# Patient Record
Sex: FEMALE | Race: WHITE | NOT HISPANIC OR LATINO | Employment: OTHER | URBAN - METROPOLITAN AREA
[De-identification: names, ages, dates, MRNs, and addresses within clinical notes are randomized per-mention and may not be internally consistent; named-entity substitution may affect disease eponyms.]

---

## 2017-03-29 ENCOUNTER — CONVERSION ENCOUNTER (OUTPATIENT)
Dept: MAMMOGRAPHY | Facility: HOSPITAL | Age: 57
End: 2017-03-29

## 2018-04-24 ENCOUNTER — CONVERSION ENCOUNTER (OUTPATIENT)
Dept: MAMMOGRAPHY | Facility: HOSPITAL | Age: 58
End: 2018-04-24

## 2018-08-15 ENCOUNTER — OFFICE VISIT CONVERTED (OUTPATIENT)
Dept: ONCOLOGY | Facility: HOSPITAL | Age: 58
End: 2018-08-15
Attending: INTERNAL MEDICINE

## 2018-09-12 ENCOUNTER — OFFICE VISIT CONVERTED (OUTPATIENT)
Dept: ONCOLOGY | Facility: HOSPITAL | Age: 58
End: 2018-09-12
Attending: INTERNAL MEDICINE

## 2018-09-27 ENCOUNTER — OFFICE VISIT CONVERTED (OUTPATIENT)
Dept: ONCOLOGY | Facility: HOSPITAL | Age: 58
End: 2018-09-27
Attending: INTERNAL MEDICINE

## 2019-01-02 ENCOUNTER — HOSPITAL ENCOUNTER (OUTPATIENT)
Dept: OTHER | Facility: HOSPITAL | Age: 59
Discharge: HOME | End: 2019-01-02
Attending: INTERNAL MEDICINE

## 2019-01-02 LAB
25(OH)D3 SERPL-MCNC: 39.8 NG/ML (ref 30–100)
ERYTHROCYTE [SEDIMENTATION RATE] IN BLOOD: 12 MM/H (ref 0–30)
T4 FREE SERPL-MCNC: 1.6 NG/DL (ref 0.9–1.8)
TSH SERPL-ACNC: 4.4 M[IU]/L (ref 0.27–4.2)

## 2019-01-03 LAB
C3 SERPL-MCNC: 78 MG/DL (ref 82–167)
C4 SERPL-MCNC: <2 MG/DL (ref 14–44)
CH50 SERPL-ACNC: <14 U/ML
CONV HEPATITIS COMMENT: NORMAL
DSDNA AB SER-ACNC: <1 IU/ML (ref 0–9)
HAV AB SER QL IA: NEGATIVE
HAV IGM SERPL QL IA: NEGATIVE
HBV CORE AB SER DONR QL IA: NEGATIVE
HBV CORE IGM SERPL QL IA: NEGATIVE
HBV SURFACE AB SER QL: NON REACTIVE
HBV SURFACE AG SERPL QL IA: NEGATIVE
HCV AB S/CO SERPL IA: <0.1 S/CO RATIO (ref 0–0.9)

## 2019-01-08 LAB — Lab: <1.2 UG EQ/ML

## 2019-04-10 ENCOUNTER — HOSPITAL ENCOUNTER (OUTPATIENT)
Dept: OTHER | Facility: HOSPITAL | Age: 59
Discharge: HOME | End: 2019-04-10
Attending: INTERNAL MEDICINE

## 2019-04-10 LAB
25(OH)D3 SERPL-MCNC: 44.5 NG/ML (ref 30–100)
ERYTHROCYTE [SEDIMENTATION RATE] IN BLOOD: 5 MM/H (ref 0–30)
T4 FREE SERPL-MCNC: 1.8 NG/DL (ref 0.9–1.8)
TSH SERPL-ACNC: 1.61 M[IU]/L (ref 0.27–4.2)

## 2019-07-15 ENCOUNTER — HOSPITAL ENCOUNTER (OUTPATIENT)
Dept: OTHER | Facility: HOSPITAL | Age: 59
Discharge: HOME | End: 2019-07-15
Attending: INTERNAL MEDICINE

## 2019-07-15 LAB
25(OH)D3 SERPL-MCNC: 43.4 NG/ML (ref 30–100)
CALCIUM SERPL-MCNC: 9.2 MG/DL (ref 8.7–10.4)
ERYTHROCYTE [SEDIMENTATION RATE] IN BLOOD: 6 MM/H (ref 0–30)

## 2019-11-07 ENCOUNTER — HOSPITAL ENCOUNTER (OUTPATIENT)
Dept: OTHER | Facility: HOSPITAL | Age: 59
Discharge: HOME | End: 2019-11-07
Attending: INTERNAL MEDICINE

## 2019-11-07 LAB
25(OH)D3 SERPL-MCNC: 42 NG/ML (ref 30–100)
CALCIUM SERPL-MCNC: 9.4 MG/DL (ref 8.7–10.4)
ERYTHROCYTE [SEDIMENTATION RATE] IN BLOOD: 4 MM/H (ref 0–30)

## 2020-03-11 ENCOUNTER — HOSPITAL ENCOUNTER (OUTPATIENT)
Dept: OTHER | Facility: HOSPITAL | Age: 60
Discharge: HOME | End: 2020-03-11
Attending: INTERNAL MEDICINE

## 2020-03-11 LAB
25(OH)D3 SERPL-MCNC: 35.1 NG/ML (ref 30–100)
CALCIUM SERPL-MCNC: 9 MG/DL (ref 8.7–10.4)
ERYTHROCYTE [SEDIMENTATION RATE] IN BLOOD: 2 MM/H (ref 0–30)

## 2020-08-07 ENCOUNTER — HOSPITAL ENCOUNTER (OUTPATIENT)
Dept: OTHER | Facility: HOSPITAL | Age: 60
Discharge: HOME | End: 2020-08-07
Attending: NURSE PRACTITIONER

## 2020-11-24 ENCOUNTER — HOSPITAL ENCOUNTER (OUTPATIENT)
Dept: OTHER | Facility: HOSPITAL | Age: 60
Discharge: HOME | End: 2020-11-24
Attending: INTERNAL MEDICINE

## 2020-11-24 LAB
25(OH)D3 SERPL-MCNC: 29.8 NG/ML (ref 30–100)
CALCIUM SERPL-MCNC: 8.8 MG/DL (ref 8.7–10.4)
CREAT UR-MCNC: 0.9 MG/DL (ref 0.5–0.9)

## 2021-01-29 ENCOUNTER — HOSPITAL ENCOUNTER (OUTPATIENT)
Dept: OTHER | Facility: HOSPITAL | Age: 61
Discharge: HOME | End: 2021-01-29
Attending: NURSE PRACTITIONER

## 2021-02-02 ENCOUNTER — HOSPITAL ENCOUNTER (OUTPATIENT)
Dept: ONCOLOGY | Facility: HOSPITAL | Age: 61
Discharge: HOME | End: 2021-02-02
Attending: INTERNAL MEDICINE

## 2021-02-02 ENCOUNTER — OFFICE VISIT CONVERTED (OUTPATIENT)
Dept: ONCOLOGY | Facility: HOSPITAL | Age: 61
End: 2021-02-02
Attending: INTERNAL MEDICINE

## 2021-02-03 LAB
CONV IMMUNOGLOBULIN G (IGG): 242 MG/DL (ref 586–1602)
CONV IMMUNOGLOBULIN M (IGM): 61 MG/DL (ref 26–217)
IGA SERPL-MCNC: 8 MG/DL (ref 87–352)
PROT PATTERN SERPL IFE-IMP: ABNORMAL

## 2021-05-28 VITALS
BODY MASS INDEX: 47.09 KG/M2 | WEIGHT: 293 LBS | SYSTOLIC BLOOD PRESSURE: 134 MMHG | OXYGEN SATURATION: 100 % | RESPIRATION RATE: 18 BRPM | HEIGHT: 66 IN | DIASTOLIC BLOOD PRESSURE: 74 MMHG | TEMPERATURE: 98.5 F | HEART RATE: 109 BPM

## 2021-05-28 VITALS
HEIGHT: 66 IN | HEART RATE: 70 BPM | SYSTOLIC BLOOD PRESSURE: 126 MMHG | DIASTOLIC BLOOD PRESSURE: 67 MMHG | TEMPERATURE: 98.6 F | OXYGEN SATURATION: 100 % | WEIGHT: 133.82 LBS | OXYGEN SATURATION: 99 % | HEART RATE: 66 BPM | DIASTOLIC BLOOD PRESSURE: 68 MMHG | BODY MASS INDEX: 21.15 KG/M2 | WEIGHT: 135.14 LBS | OXYGEN SATURATION: 99 % | TEMPERATURE: 99.2 F | SYSTOLIC BLOOD PRESSURE: 130 MMHG | WEIGHT: 131.61 LBS | DIASTOLIC BLOOD PRESSURE: 80 MMHG | HEIGHT: 66 IN | SYSTOLIC BLOOD PRESSURE: 130 MMHG | TEMPERATURE: 98.3 F | HEIGHT: 66 IN | BODY MASS INDEX: 21.51 KG/M2 | HEART RATE: 62 BPM | BODY MASS INDEX: 21.72 KG/M2

## 2021-05-28 NOTE — PROGRESS NOTES
Patient: JEFF GALE     Acct: [id number]     Report: #[id number]  UNIT #: [id number]     : 1960    Encounter Date:2018  PRIMARY CARE: LEIGH ANN SEGAL  ***Signed***  --------------------------------------------------------------------------------------------------------------------  Visit Type      Established Patient Visit            Chief Complaint      Fatigue.  lymphoma            Referring Provider/Copies To      Referring Provider:  Ramakrishna Valencia            Allergies      Coded Allergies:             LEVOFLOXACIN (Verified  Allergy, Severe, ANAPHALAXIS, 8/15/18)            Medications      Last Reconciled on 18 08:50 by SALVADOR MARTELL MD      Vacyclovir HCl (Valtrex) 1,000 Mg Tablet      1000 MG PO QDAY, TAB         Reported         18       rOPINIRole HCl (Requip) 0.25 Mg Tab      0.25 MG PO HS for 30 Days, #30 TAB         Reported         18       traZODone HCl (traZODone HCl*) 100 Mg Tablet      100 MG PO HS, #30 TAB 0 Refills         Reported         18       Alendronate Sodium (Alendronate) Unknown Strength Tablet      PO Fr, #4 TAB         Reported         17       Cyanocobalamin (Cyanocobalamin Injection) Unknown Strength Vial      IM ONCE, #1 VIAL         Reported         17       Glucosamine Hcl/Chondro Garcia A (GLUCOSAMINE-CHONDROITIN 500 mg/400 mg/10 ml)     Unknown Strength Liquid      PO QDAY, ML         Reported         17       Aspirin (Aspirin Baby *) Unknown Strength Tab.chew      PO QDAY, #30 TAB.CHEW 0 Refills         Reported         17       Naproxen Sodium (Aleve) 220 Mg Tab      1 TAB PO BID, TAB         Reported         9/21/10       (Provair)   No Conflict Check      1 - 2 PUFFS INH PRN         Reported         9/21/10       Levothyroxine Sodium (Levothroid*) 0.1 Mg Tablet      1 TAB PO QAM         Reported         9/21/10            History and Present Illness      Past Oncology Illness History      Mrs. Gale  is a pleasant but complicated lady with remote history of lymphoma     which was diagnosed and treated in 2010 and she continues to be in complete     remission. More recently she had developed significant problems with anemia,     neutropenia as well as intermittent thrombocytopenia. Patient is known to have     joint problems as well as splenomegaly. She has been evaluated by bone marrow     transplant program at the [facility] by Dr. cadena and Dr. Wagner. It     was felt that patient has overlap autoimmune disorder even though she does not     have characteristic findings of rheumatoid arthritis or lupus. 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 She also gets     B12 injections for pernicious anemia. She is known to have hypothyroidism     because of which she is maintained on thyroid hormone replacement therapy.     Patient is an ex-smoker and because of significant smoking history she underwent     a screening CT scan of the chest in April 2018 which was negative but patient     was found to have emphysema. At the moment I do not have complete records of the     patient from [facility] which we are trying to obtain. Her only     complaint is lower back pain 5 out of 10 and fatigue.            Patient has complex autoimmune problems causing pancytopenia and splenomegaly.     She has been treated with Remicade with significant improvement of her anemia,     in fact she has become transfusion independent. We will repeat the blood work     today including autoimmune serology. We will try to obtain her old records     spastically most recent CT scan of the abdomen to document splenomegaly and to     make sure there is no significant lymphadenopathy for recurrent lymphoma. We     will try to obtain her records regarding her lymphoma diagnosis as well. In the      meantime we will start her on B12 replacement injection therapy.            -September .  WBC 5.25.  Hemoglobin 12.1.  Platelet count 123,000.  Iron     49.  TIBC 355.  Ferritin 80.            HPI - Oncology Interim      Patient described fatigue has improved. Feels like weakness all over the body.     No alleviating factors. Fatigue is worsened with activities of daily living. No     associated symptoms of pain.            PAST, FAMILY   Past Medical History      Past Medical History:  No Diabetes Type 1, No Diabetes Type 2; Thyroid Disease;     No COPD, No Emphysema, No Hypertension, No Stroke, No High Cholesterol, No Heart     Attack, No Bleeding Condition, No Low or High RBC Count, No Low or High WBC     Count, No Low or High Platelet Coun, No Hepatitis, No Kidney Disease, No     Depression, No Alzheimer's Disease, No Mental Disease, No Seizures; Arthritis,     Osteoporosis; No Osteopenia, No Short of Air, No Sleep apnea, No Liver Disease,     No STD, No Enlarged Prostate, No Other      Hematology/oncology:  REPORTS HX OF: Anemia, Lymphoma; DENIES HX OF: Previous     Treatment for CA, Bladder Cancer, Blood cancer, Brain cancer, Breast cancer,     Cervical cancer, Coagulopathy, Colorectal cancer, Endocrine cancer, Eye cancer,     GI cancer,  cancer, Kidney cancer, Leukemia, Leukocytosis, Leukopenia, Liver     cancer, Lung cancer, Musculoskeletal cancer, Myeloma, Neurologic cancer, Oral     cancer, Ovarian cancer, Skin cancer, Stomach cancer, Thrombocytopenia, Thyroid     cancer, Uterine cancer, Other cancer history, Other hematologic history      Genetic/metabolic:  DENIES HX OF: Cystic fibrosis, Down syndrome, Other genetic     history, Other metabolic history            Past Surgical History      REPORTS HX OF: Biopsy (3 BONE MARROW BIOPSIES), Other Past Surgical Hx (OPEN     HEART SURGERY, TUBAL LIAGATION, SKIN GRAFT ON LEG, TONSILS REMOVED AND WISDOM     TEETH); DENIES HX OF: Cataract extraction,  Thyroid surgery, Lung biopsy, CABG     surgery, Coronary stent, Valve replacement, Appendectomy, Cholecystectomy,     Splenectomy, Bladder surgery, Nephrectomy, Joint replacement, Frature repair,     Skin cancer removal, Melanoma excision, Spinal surgery, Breast biopsy,     Lumpectomy, Mastectomy, bilateral, Mastectomy, right, Mastectomy, left,     Hysterectomy, Peg Tube Placement, VAD Placement            Family History      REPORTS HX OF: Anemia (SISTER); DENIES HX OF: Blood disorders, Blood Cancer,     Breast cancer, Cervical cancer, Coagulopathy, Colorectal cancer, Endocrine     Cancer, Eye Cancer, GI Cancer,  Cancer, Kidney Cancer, Leukemia, Leukocytosis,     Leukopenia, Liver Cancer, Lung cancer, Lymphoma, Melanoma, Musculoskeletal     Cancer, Myeloma, Neurologic Cancer, Oral Cancer, Ovarian cancer, Prostate     cancer, Skin Cancer, Stomach Cancer, Testicular Cancer, Thrombocytopenia,     Thyroid cancer, Uterine cancer, Other Cancer History, Other Hematology History            Social History      Lives independently:  No            Tobacco Use      Tobacco status:  Former smoker      Smoking packs/day:  1      Quit status:  Quit date established (11/2013)            Alcohol Use      Alcohol intake:  None            Substance Use      Substance use:  Denies use            REVIEW OF SYSTEMS      General:  Complains of: Fatigue; Denies: Appetite change, Excessive sweating,     Fever, Night sweats, Weight gain, Weight loss, Other      Eyes:  Denies: Blurred vision, Corrective lenses, Diplopia, Eye irritation, Eye     pain, Eye redness, Spots in vision, Vision loss, Other      Ears, nose, mouth, throat:  Denies: Headache, Seizures, Visual Changes, Hearing     loss, Sinus Congestion, Hoarseness, Sore throat, Other      Cardiovascular:  Denies: Chest pain, Irregular heartbeat, Palpitations, Swollen     ankles/legs, Other      Respiratory:  Denies: Chest pain, Shortness of Air, Productive cough, Coughing     blood,  Other      Gastrointestinal:  Denies: Nausea, Vomiting, Problem swallowing, Frequent hear    tburn, Constipation, Diarrhea, Tarry stools, Bloody stools, Unable to control     bowels, Other      Kidney/Bladder:  Denies: Painful Urination, Change in urinary stream, Blood in     urine, Incontinence, Frequent Urination, Decreased urine stream, Other      Musculoskeletal:  Denies: New Back pain, Leg Cramps, Painful Joints, Swollen     Joints, Muscle Pain, Muscle weakness, Other      Skin:  DENIES: Jaundice, Easy Bleeding, Lesions/changes in moles, Nail changes,     Skin Discoloration, Rash, Other      Neurological:  Denies: Dizziness, Fainting, Numbness\Tingling, Paralysis,     Seizures, Other      Psychiatric:  Complains of: AAO X 3; Denies: Anxiety, Panic attacks, Depression,     Memory loss, Other      Endocrine:  DiabetesThyroid DisorderOsteoporosisEndocrine Other      Hematologic/lymphatic:  Denies: Bruising, Bleeding, Enlarged Lymph Nodes,     Recurrent infections, Other      Reproductive:  Denies Pregnant, Denies Menopause, Denies Still Menstruating,     Denies Heavy Periods, Denies Other            VITAL SIGNS,PAIN/FATIGUE SCORE      Vitals      Height 5 ft 5.75 in / 167 cm      Weight 131 lbs 9.834 oz / 59.7 kg      BSA 1.66 m2      BMI 21.4 kg/m2      Temperature 99.2 F / 37.33 C - Temporal      Pulse 62      Blood Pressure 126/80 Sitting, Left Arm      Pulse Oximetry 99%, rm air            Pain Score      Experiencing any pain?:  Yes      Pain Scale Used:  Numerical      Pain Intensity:  3            Fatigue Score      Experiencing any fatigue?:  Yes      Fatigue (0-10 scale):  5            EXAM      General Appearance:  Alert, Oriented X3, Cooperative      Eyes:  Anicteric Sclerae, Moist Conjunctiva, PERRLA      HEENT:  Orophraynx clear, No Exudates      Neck:  Supple, Full ROM, No Masses or JVD      Respiratory:  CTAB; No Diminished Breath      Abdomen\Gastro:  Soft, No NABS; No Masses      Cardio:  RRR, No  Murmur, No, Peripheral Edema, Normal PMI      Skin:  Normal Temperature, Normal Tone, Normal Texture and Turgor      Psychiatric:  Appropriate Affect, Intact Judgement, AAO x3      Neuro:  Cranial Nerve II-XII Inta, No Focal Sensory Deficit      Muscularskeletal:  Normal Gait and Station, Full ROM of extremeties, Full muscle     strength\tone      Extremities:  No Digital Cyanosis, No Digital Ischemia, Pedal Pulses Intact,     Pedal Pulses Symetrical, Normal Gait and station      Lymphatic:  No Cervical, No Supraclavicular, No Infraclavicular, No Axillary, No     Inguinal            PREVENTION      Hx Influenza Vaccination:  Yes      Date Influenza Vaccine Given:  Oct 1, 2017      Influenza Vaccine Declined:  No      2 or More Falls Past Year?:  No      Fall Past Year with Injury?:  No      Hx Pneumococcal Vaccination:  Yes      Encouraged to follow-up with:  PCP regarding preventative exams.      Chart initiated by      Lisset Pryor cma            IMPRESSION/PLAN      Diagnosis      Lymphoma - C85.90      -diagnosed and treated in 2010 treated at the [facility]      -Farhan in remission.      -Check bone marrow biopsy      New Diagnostics      * CBC, Routine      * CMP Comp Metabolic Panel, Routine      * Iron Profile, Routine      * Sed Rate, Routine            Anemia - D64.9      -Hemoglobin last visit was 11.5.       -Worsening fatigue.       -Check CBC today.             Thrombocytopenia - D69.6      -Platelets are 123,000 last visit.       -No signs or symptoms of bleeding.       -Check CBC today.             COPD (chronic obstructive pulmonary disease) - J44.9      -Lungs are clear today      -Continue inhalers as needed            Oncology follow-up encounter - Z09      -Patient's radiology exams, blood tests, physicians' notes, and medications were     reviewed today to assess patient's medical treatment plan.       -Radiology imaging tests from April 2018 to today were reviewed  independently by     me by direct visualization of the images.        -Old medical records were reviewed and summarized in chronological order in the     HPI today to maintain an updated medical record.       -Return to clinic 3 months            Notes      New Diagnostics      * Bone Marrow, Aspiration, BX CT, Routine         Dx: MDS (myelodysplastic syndrome) - D46.9            Patient Education            Chronic Obstructive Pulmonary Disease      Patient Education Provided:  Yes                 Disclaimer: Converted document may not contain table formatting or lab diagrams. Please see [employer] [facility] for the authenticated document.

## 2021-05-28 NOTE — PROGRESS NOTES
Patient: JEFF SOUSA     Acct: [id number]     Report: #[id number]  UNIT #: [id number]     : 1960    Encounter Date:2018  PRIMARY CARE: LEIGH ANN SEGAL  ***Signed***  --------------------------------------------------------------------------------------------------------------------  Visit Type      Established Patient Visit            Chief Complaint      LYMPHOMA            Referring Provider/Copies To      Referring Provider:  Ramakrishna Valencia            Allergies      Coded Allergies:             LEVOFLOXACIN (Verified  Allergy, Severe, ANAPHALAXIS, 18)            Medications      Last Reconciled on 18 09:29 by SALVADOR MARTELL MD      Vacyclovir HCl (Valtrex) 1,000 Mg Tablet      1000 MG PO QDAY, TAB         Reported         18       rOPINIRole HCl (Requip) 0.25 Mg Tab      0.25 MG PO HS for 30 Days, #30 TAB         Reported         18       traZODone HCl (traZODone HCl*) 100 Mg Tablet      100 MG PO HS, #30 TAB 0 Refills         Reported         18       Alendronate Sodium (Alendronate) Unknown Strength Tablet      PO Fr, #4 TAB         Reported         17       Cyanocobalamin (Cyanocobalamin Injection) Unknown Strength Vial      IM ONCE, #1 VIAL         Reported         17       Glucosamine Hcl/Chondro Garcia A (GLUCOSAMINE-CHONDROITIN 500 mg/400 mg/10 ml)     Unknown Strength Liquid      PO QDAY, ML         Reported         17       Aspirin (Aspirin Baby *) Unknown Strength Tab.chew      PO QDAY, #30 TAB.CHEW 0 Refills         Reported         17       Naproxen Sodium (Aleve) 220 Mg Tab      1 TAB PO BID, TAB         Reported         9/21/10       (Provair)   No Conflict Check      1 - 2 PUFFS INH PRN         Reported         9/21/10       Levothyroxine Sodium (Levothroid*) 0.1 Mg Tablet      1 TAB PO QAM         Reported         9/21/10      Medications Reviewed:  No Changes made to meds            History and Present Illness      Past  Oncology Illness History      Mrs. Gale is a pleasant but complicated lady with remote history of lymphoma     which was diagnosed and treated in 2010 and she continues to be in complete     remission. More recently she had developed significant problems with anemia,     neutropenia as well as intermittent thrombocytopenia. Patient is known to have     joint problems as well as splenomegaly. She has been evaluated by bone marrow     transplant program at the [facility] by Dr. cadena and Dr. Wagner. It     was felt that patient has overlap autoimmune disorder even though she does not     have characteristic findings of rheumatoid arthritis or lupus. Because of the     transfusion dependent anemia she was started on Remicade therapy intravenously     on a weekly basis with significant improvement of her blood counts. She also     gets B12 injections for pernicious anemia. She is known to have hypothyroidism     because of which she is maintained on thyroid hormone replacement therapy.     Patient is an ex-smoker and because of significant smoking history she underwent     a screening CT scan of the chest in April 2018 which was negative but patient     was found to have emphysema. At the moment I do not have complete records of the     patient from [facility] which we are trying to obtain. Her only     complaint is lower back pain 5 out of 10 and fatigue.            Patient has complex autoimmune problems causing pancytopenia and splenomegaly.     She has been treated with Remicade with significant improvement of her anemia, i    n fact she has become transfusion independent. We will repeat the blood work     today including autoimmune serology. We will try to obtain her old records     spastically most recent CT scan of the abdomen to document splenomegaly and to     make sure there is no significant lymphadenopathy for recurrent lymphoma. We     will try to obtain her records regarding her  lymphoma diagnosis as well. In the     meantime we will start her on B12 replacement injection therapy.            -September .  WBC 5.25.  Hemoglobin 12.1.  Platelet count 123,000.  Iron     49.  TIBC 355.  Ferritin 80.      -September .  WBC 3.79.  Hemoglobin 12.2.  Platelet count of 4000      -September .  Bone marrow biopsy showed no malignancy. There were 0.07%     clonal cells indicative of MGUS.            HPI - Oncology Interim      Patient described fatigue has improved. Feels like weakness all over the body.     No alleviating factors. Fatigue is worsened with activities of daily living. No     associated symptoms of pain.            Most Recent Lab Findings      Laboratory Tests      9/19/18 11:05            PAST, FAMILY   Past Medical History      Past Medical History:  No Diabetes Type 1, No Diabetes Type 2; Thyroid Disease;     No COPD, No Emphysema, No Hypertension, No Stroke, No High Cholesterol, No Heart     Attack, No Bleeding Condition, No Low or High RBC Count, No Low or High WBC     Count, No Low or High Platelet Coun, No Hepatitis, No Kidney Disease, No     Depression, No Alzheimer's Disease, No Mental Disease, No Seizures; Arthritis,     Osteoporosis; No Osteopenia, No Short of Air, No Sleep apnea, No Liver Disease,     No STD, No Enlarged Prostate, No Other      Hematology/oncology:  REPORTS HX OF: Anemia, Lymphoma; DENIES HX OF: Previous     Treatment for CA, Bladder Cancer, Blood cancer, Brain cancer, Breast cancer,     Cervical cancer, Coagulopathy, Colorectal cancer, Endocrine cancer, Eye cancer,     GI cancer,  cancer, Kidney cancer, Leukemia, Leukocytosis, Leukopenia, Liver     cancer, Lung cancer, Musculoskeletal cancer, Myeloma, Neurologic cancer, Oral     cancer, Ovarian cancer, Skin cancer, Stomach cancer, Thrombocytopenia, Thyroid     cancer, Uterine cancer, Other cancer history, Other hematologic history      Genetic/metabolic:  DENIES HX OF: Cystic  fibrosis, Down syndrome, Other genetic     history, Other metabolic history            Past Surgical History      REPORTS HX OF: Biopsy (3 BONE MARROW BIOPSIES), Other Past Surgical Hx (OPEN     HEART SURGERY, TUBAL LIAGATION, SKIN GRAFT ON LEG, TONSILS REMOVED AND WISDOM     TEETH); DENIES HX OF: Cataract extraction, Thyroid surgery, Lung biopsy, CABG     surgery, Coronary stent, Valve replacement, Appendectomy, Cholecystectomy,     Splenectomy, Bladder surgery, Nephrectomy, Joint replacement, Frature repair,     Skin cancer removal, Melanoma excision, Spinal surgery, Breast biopsy,     Lumpectomy, Mastectomy, bilateral, Mastectomy, right, Mastectomy, left, H    ysterectomy, Peg Tube Placement, VAD Placement            Family History      REPORTS HX OF: Anemia (SISTER); DENIES HX OF: Blood disorders, Blood Cancer,     Breast cancer, Cervical cancer, Coagulopathy, Colorectal cancer, Endocrine     Cancer, Eye Cancer, GI Cancer,  Cancer, Kidney Cancer, Leukemia, Leukocytosis,     Leukopenia, Liver Cancer, Lung cancer, Lymphoma, Melanoma, Musculoskeletal     Cancer, Myeloma, Neurologic Cancer, Oral Cancer, Ovarian cancer, Prostate     cancer, Skin Cancer, Stomach Cancer, Testicular Cancer, Thrombocytopenia,     Thyroid cancer, Uterine cancer, Other Cancer History, Other Hematology History            Social History      Lives independently:  No            Tobacco Use      Tobacco status:  Former smoker      Smoking packs/day:  1      Quit status:  Quit date established (11/2013)            Alcohol Use      Alcohol intake:  None            Substance Use      Substance use:  Denies use            REVIEW OF SYSTEMS      General:  Complains of: Fatigue; Denies: Appetite change, Excessive sweating,     Fever, Night sweats, Weight gain, Weight loss, Other      Eyes:  Denies: Blurred vision, Corrective lenses, Diplopia, Eye irritation, Eye     pain, Eye redness, Spots in vision, Vision loss, Other      Ears, nose, mouth,  throat:  Denies: Headache, Seizures, Visual Changes, Hearing     loss, Sinus Congestion, Hoarseness, Sore throat, Other      Cardiovascular:  Denies: Chest pain, Irregular heartbeat, Palpitations, Swollen     ankles/legs, Other      Respiratory:  Denies: Chest pain, Shortness of Air, Productive cough, Coughing     blood, Other      Gastrointestinal:  Denies: Nausea, Vomiting, Problem swallowing, Frequent     heartburn, Constipation, Diarrhea, Tarry stools, Bloody stools, Unable to     control bowels, Other      Kidney/Bladder:  Denies: Painful Urination, Change in urinary stream, Blood in     urine, Incontinence, Frequent Urination, Decreased urine stream, Other      Musculoskeletal:  Complains of: Muscle Pain; Denies: New Back pain, Leg Cramps,     Painful Joints, Swollen Joints, Muscle weakness, Other      Skin:  DENIES: Jaundice, Easy Bleeding, Lesions/changes in moles, Nail changes,     Skin Discoloration, Rash, Other      Neurological:  Denies: Dizziness, Fainting, Numbness\Tingling, Paralysis,     Seizures, Other      Psychiatric:  Complains of: AAO X 3; Denies: Anxiety, Panic attacks, Depression,     Memory loss, Other      Endocrine:  DiabetesThyroid DisorderOsteoporosisEndocrine Other      Hematologic/lymphatic:  Denies: Bruising, Bleeding, Enlarged Lymph Nodes,     Recurrent infections, Other      Reproductive:  Denies Pregnant, Denies Menopause, Denies Still Menstruating,     Denies Heavy Periods, Denies Other            VITAL SIGNS,PAIN/FATIGUE SCORE      Vitals      Height 5 ft 5.75 in / 167 cm      Weight 135 lbs 2.272 oz / 61.3 kg      BSA 1.69 m2      BMI 22.0 kg/m2      Temperature 98.3 F / 36.83 C - Temporal      Pulse 66      Blood Pressure 130/68 Sitting, Left Arm      Pulse Oximetry 99%, ROOM AIR            Pain Score      Experiencing any pain?:  Yes      Pain Scale Used:  Numerical      Pain Intensity:  6            Fatigue Score      Experiencing any fatigue?:  Yes      Fatigue (0-10 scale):   8            EXAM      General Appearance:  Alert, Oriented X3, Cooperative      Eyes:  Anicteric Sclerae, Moist Conjunctiva, PERRLA      HEENT:  Orophraynx clear, No Erythema, No Exudates      Neck:  Supple, Full ROM, No Masses or JVD      Respiratory:  CTAB; No Rales, No Crackles      Cardio:  RRR, No Murmur, No, Peripheral Edema, Normal PMI      Skin:  Normal Temperature, Normal Texture and Turgor      Psychiatric:  Appropriate Affect, Intact Judgement, AAO x3      Neuro:  Cranial Ner II-XII Intact, No Focal Sensory Deficit      Muscularskeletal:  Normal Gait and Station, Full ROM of extremeties      Extremities:  No Digital Cyanosis, No Digital Ischemia      Lymphatic:  No Axillary, No Cervical, No Inguinal, No Supraclavicular            PREVENTION      Hx Influenza Vaccination:  Yes      Date Influenza Vaccine Given:  Oct 1, 2017      Influenza Vaccine Declined:  No      2 or More Falls Past Year?:  No      Fall Past Year with Injury?:  No      Hx Pneumococcal Vaccination:  Yes      Encouraged to follow-up with:  PCP regarding preventative exams.      Chart initiated by      CHLOÉ ABBOTT CMA            IMPRESSION/PLAN      Diagnosis      MGUS (monoclonal gammopathy of unknown significance) - D47.2      -Bone marrow biopsy showed plasma cell dyscrasia.      -We'll order a total survey, SPEP, serum free light chains      -counseled patient this is MGUS.      New Diagnostics      * Skeletal Survey Adult, 3 Months      New Referrals      * Rheumatology, As Soon As Possible         Yudith Ochoa         Reason for Referral: evaluate for lupus            Lymphoma - C85.90      -Currently in remission      -Continue observation            Anemia - D64.9      -Hemoglobin levels last time was 3.79.       -Check CBC today.             Thrombocytopenia - D69.6      -Platelet count last time was 104,000.       -No signs or symptoms of bleeding today.       -On physical exam and clinical history patient has no evidence of a  blood clot     today.       -Check CBC today.             COPD (chronic obstructive pulmonary disease) - J44.9      -Lungs are clear today      -Continue inhalers as needed            Notes      New Diagnostics      * Thyroid Profile, 3 Months         Dx: MGUS (monoclonal gammopathy of unknown significance) - D47.2      * IMMUNOGLOBULIN QUANT IGAMQ, 3 Months         Dx: MGUS (monoclonal gammopathy of unknown significance) - D47.2      * SPEP with Immunofixation, 3 Months         Dx: MGUS (monoclonal gammopathy of unknown significance) - D47.2      * IMMUNOFIX PROT ELECTROPH URINE, 3 Months         Dx: MGUS (monoclonal gammopathy of unknown significance) - D47.2      * Free Light Chains Ka, 3 Months         Dx: MGUS (monoclonal gammopathy of unknown significance) - D47.2      * 24HR Protein Ua, 3 Months         Dx: MGUS (monoclonal gammopathy of unknown significance) - D47.2            Plan      -Return to clinic 3 months.      -Today's Plan.  Check serum free light chains.  Check myeloma blood tests..            Patient Education            Chronic Obstructive Pulmonary Disease      Patient Education Provided:  Yes                 Disclaimer: Converted document may not contain table formatting or lab diagrams. Please see [employer] [facility] for the authenticated document.

## 2021-05-28 NOTE — PROGRESS NOTES
Patient: JEFF GALE     Acct: [id number]     Report: #[id number]  UNIT #: [id number]     : 1960    Encounter Date:2021  PRIMARY CARE: LEIGH ANN SEGAL  ***Signed***  --------------------------------------------------------------------------------------------------------------------  NURSE INTAKE      Visit Type      New Patient Visit            Chief Complaint      HX OF MONOCLONAL GAMMOPATHY            Referring Provider/Copies To      Referring Provider:  Yudith Ochoa      Primary Care Provider:  MARIZOL GRIGSBY      Copies To:   MARIZOL GRIGSBY            History and Present Illness      Past Hx      Mrs. Gale is a pleasant but complicated lady with remote history of lymphoma     which was diagnosed and treated in  and she continues to be in complete     remission. More recently she had developed significant problems with anemia,     neutropenia as well as intermittent thrombocytopenia. Patient is known to have     joint problems as well as splenomegaly. She has been evaluated by bone marrow     transplant program at the [facility] by Dr. cadena and Dr. Wagner. It     was felt that patient has overlap autoimmune disorder even though she does not     have characteristic findings of rheumatoid arthritis or lupus. Because of the     transfusion dependent anemia she was started on Remicade therapy intravenously     on a weekly basis with significant improvement of her blood counts. She also     gets B12 injections for pernicious anemia. She is known to have hypothyroidism     because of which she is maintained on thyroid hormone replacement therapy.     Patient is an ex-smoker and because of significant smoking history she underwent    a screening CT scan of the chest in 2018 which was negative but patient     was found to have emphysema. At the moment I do not have complete records of the    patient from [facility] which we are trying to  obtain. Her only     complaint is lower back pain 5 out of 10 and fatigue.            Patient has complex autoimmune problems causing pancytopenia and splenomegaly.     She has been treated with Remicade with significant improvement of her anemia,     in fact she has become transfusion independent. We will repeat the blood work     today including autoimmune serology. We will try to obtain her old records     spastically most recent CT scan of the abdomen to document splenomegaly and to     make sure there is no significant lymphadenopathy for recurrent lymphoma.. In     the meantime we will start her on B12 replacement injection therapy.            [facility] - Hematology Interim      Patient reports that she received Rituxan x4 doses in 2017 for her diagnosis of     Waldenstroms      In 2018 she received the Remicade for the overlap autoimmune disorder causing     pancytopenia and splenomegaly       Shehad significant improvement in her blood counts which has been maintained     since discontinuing [id number]      She continues on monthly B12 injections      Labs 12/22/2020 hemoglobin of 11.3 hematocrit 35.3 platelet count 132 WBC 5.3      ALT 14 AST 25 BUN 12 creatinine 0.8            Pt presents for f/u monoclonal gammopathy            PAST, FAMILY   Past Medical History      Past Medical History:  Arthritis, Osteopenia, Thyroid Disease      Hematology/Oncology (F):  Anemia, Lymphoma (NON [place])            Past Surgical History            TUBAL LIGATION            Family History            LUPUS--SISTER      CROHNS--DAUGHTER            Social History      Marital Status:        Lives independently:  Yes      Number of Children:  2            Tobacco Use      Tobacco status:  Former smoker      Smoking packs/day:  1      Quit status:  Quit date established (2017)            Alcohol Use      Alcohol intake:  OCCASIONAL            Substance Use      Substance use:  Denies use            REVIEW OF SYSTEMS       General:  Admits: Fatigue;          Denies: Appetite Change, Fever, Night Sweats, Weight Gain, Weight Loss      Eye:  Admits Corrective Lenses; Denies Blurred Vision, Denies Diplopia, Denies     Vision Changes      ENT:  Denies Headache, Denies Hearing Loss, Denies Hoarseness, Denies Sore     Throat      Cardiovascular:  Denies Chest Pain, Denies Palpitations      Respiratory:  Denies: Cough, Coughing Blood, Productive Cough, Shortness of Air,    Wheezing      Gastrointestinal:  Denies Bloody Stools, Denies Constipation, Denies Diarrhea,     Denies Nausea/Vomiting, Denies Problem Swallowing, Denies Unable to Control     Bowels      Genitourinary:  Denies Blood in Urine, Denies Incontinence, Denies Painful     Urination      Musculoskeletal:  Denies Back Pain, Denies Muscle Pain, Denies Painful Joints      Integumentary:  Denies Itching, Denies Lesions, Denies Rash      Neurologic:  Denies Dizziness, Denies Numbness\Tingling, Denies Seizures      Psychiatric:  Denies Anxiety, Denies Depression      Endocrine:  Denies Cold Intolerance, Denies Heat Intolerance      Hematologic/Lymphatic:  Admits Bruising; Denies Bleeding, Denies Enlarged Lymph     Nodes      Reproductive:  Denies: Menopause, Heavy Periods, Pregnant, Still Menstruating            VITAL SIGNS AND SCORES      Vitals      Height 5 ft 5.83 in / 167.2 cm      Weight 368 lbs 9.746 oz / 167.2 kg      BSA 2.59 m2      BMI 59.8 kg/m2      Temperature 98.5 F / 36.94 C - Temporal      Pulse 109      Respirations 18      Blood Pressure 134/74 Sitting, Right Arm      Pulse Oximetry 100%, RM AIR            Pain Score      Experiencing any pain?:  No      Pain Scale Used:  Numerical      Pain Intensity:  0            Fatigue Score      Experiencing any fatigue?:  Yes      Fatigue (0-10 scale):  6            EXAM      General Appearance:  Positive for: Alert, Oriented x3, Cooperative      HEENT:  Positive for: Oropharynx clear      Respiratory:  Positive for: CTAB       Abdomen/Gastro:  Positive for: Normal Active Bowel Sounds      Cardiovascular:  Positive for: RRR      Psychiatric:  Positive for: AAO X 3      Musculoskeletal:  Positive for: Full ROM Lower Extremety      Lower Extremities:  Positive for: Edema            PREVENTION      Hx Influenza Vaccination:  Yes      Date Influenza Vaccine Given:  Oct 2, 2020      Influenza Vaccine Declined:  No      2 or More Falls in Past Year?:  No      Fall Past Year with Injury?:  No      Hx Pneumococcal Vaccination:  Yes      Encouraged to follow-up with:  PCP regarding preventative exams.      Chart initiated by      DENZEL HENLEY MA            ALLERGY/MEDS      Allergies      Coded Allergies:             LEVOFLOXACIN (Verified  Allergy, Severe, ANAPHALAXIS, 2/2/21)            Medications      Last Reconciled on 9/29/18 09:29 by SALVADOR MARTELL MD      Ergocalciferol (Vitamin D2) (Ergocalciferol) 8,000 Units/Ml Drops      2000 UNITS PO QDAY, #30 ML         Reported         2/2/21       Alendronate Sodium (Alendronate) 70 Mg Tablet      70 MG PO Fr, #4 TAB         Reported         2/2/21       Cyclobenzaprine Hcl (Cyclobenzaprine*) 10 Mg Tablet      10 MG PO QDAY, #60 TAB 0 Refills         Reported         2/2/21       hydrOXYzine HCL (hydrOXYzine HCL) 25 Mg Tablet      25 MG PO QDAY, #120 TAB 0 Refills         Reported         2/2/21       Multivitamins-Min/Fa/Ginkgo (One Daily For Women 50+ Adv Tb) 1 Each Tablet      1 EACH PO, TAB         Reported         2/2/21       Cetirizine Hcl (CETIRIZINE HCL) 10 Mg Tablet      10 MG PO QDAY, #30 TAB 0 Refills         Reported         2/2/21       Aspirin EC (Aspirin EC) 81 Mg Tablet.dr      81 MG PO QDAY, #30 TAB.SR 0 Refills         Reported         2/2/21       MDI-Albuterol (Proair HFA) 8.5 Gm Hfa.aer.ad      1 PUFFS INH Q4H PRN for SHORTNESS OF BREATH, #1 MDI 0 Refills         Reported         2/2/21       Nitrofurantoin Macrocrystals (Nitrofurantoin*) 100 Mg Capsule      PO BID, CAP 0  Refills         Reported         2/2/21       valACYclovir (valACYclovir) 1,000 Mg Tablet      1000 MG PO QDAY, TAB         Reported         6/21/18       traZODone HCl (traZODone HCl) 100 Mg Tablet      100 MG PO HS, #30 TAB 0 Refills         Reported         6/8/18       Cyanocobalamin (Cyanocobalamin Injection) Unknown Strength Vial      IM ONCE, #1 VIAL         Reported         4/25/17      Medications Reviewed:  Changes made to meds            IMPRESSION/PLAN      Impression      History of pancytopenia      History of monoclonal gammopathy possibly consistent with [place]str[place]            Diagnosis      Hx of benign monoclonal gammopathy - Z86.03            Notes      New Medications      * Nitrofurantoin Macrocrystals (Nitrofurantoin*) 100 MG CAPSULE: PO BID      * MDI-Albuterol (Proair HFA) 8.5 GM HFA.AER.AD: 1 PUFFS INH Q4H PRN SHORTNESS OF      BREATH #1      * Aspirin EC 81 MG TABLET.DR: 81 MG PO QDAY #30      * CETIRIZINE HCL 10 MG TABLET: 10 MG PO QDAY #30      * Multivitamins-Min/Fa/Ginkgo (One Daily For Women 50+ Adv Tb) 1 EACH TABLET: 1       EACH PO      * hydrOXYzine HCL 25 MG TABLET: 25 MG PO QDAY #120      * CYCLOBENZAPRINE HCL (Cyclobenzaprine*) 10 MG TABLET: 10 MG PO QDAY #60      * ALENDRONATE SODIUM (Alendronate) 70 MG TABLET: 70 MG PO Fr #4      * Ergocalciferol (Vitamin D2) (Ergocalciferol) 8,000 UNITS/ML DROPS: 2,000 UNITS      PO QDAY #30         Instructions: 8,000 UNITS = 200 MCG      New Diagnostics      * IMMUNOFIX PROT ELECTRO SERUM, Routine         Dx: Hx of benign monoclonal gammopathy - Z86.03      * Immuno G (Igg), Routine         Dx: Hx of benign monoclonal gammopathy - Z86.03      * Immuno M (Igm), Routine         Dx: Hx of benign monoclonal gammopathy - Z86.03      * Immuno A (Iga), Routine         Dx: Hx of benign monoclonal gammopathy - Z86.03            Plan      Patient with history of pancytopenia thought secondary to complex autoimmune     disease and or Waldenstroms       We will obtain serum quantitative immunoglobulins today      Review of the patient's most recent CBC and labs revealed that her blood counts     are stable      Patient is also clinically stable without any new complaints      She should follow-up in approximately 6 months at which time repeat labs will be    done if indicated            Patient Education      Patient Education Provided:  Yes            Electronically signed by Kellie Adrian  02/02/2021 16:55       Disclaimer: Converted document may not contain table formatting or lab diagrams. Please see [employer] [facility] for the authenticated document.

## 2021-05-28 NOTE — PROGRESS NOTES
Patient: JEFF SOUSA     Acct: [id number]     Report: #[id number]  UNIT #: [id number]     : 1960    Encounter Date:08/15/2018  PRIMARY CARE: LEIGH ANN SEGAL  ***Signed***  --------------------------------------------------------------------------------------------------------------------  Visit Type      Established Patient Visit            Chief Complaint      LYMPHOMA            Referring Provider/Copies To      Referring Provider:  Ramakrishna Valencia            Allergies      Coded Allergies:             LEVOFLOXACIN (Verified  Allergy, Severe, ANAPHALAXIS, 8/15/18)            Medications      Last Reconciled on 18 08:25 by SALVADOR MARTELL MD      Vacyclovir HCl (Valtrex) 1,000 Mg Tablet      1000 MG PO QDAY, TAB         Reported         18       rOPINIRole HCl (Requip) 0.25 Mg Tab      0.25 MG PO HS for 30 Days, #30 TAB         Reported         18       traZODone HCl (traZODone HCl*) 100 Mg Tablet      100 MG PO HS, #30 TAB 0 Refills         Reported         18       Alendronate Sodium (Alendronate) Unknown Strength Tablet      PO Fr, #4 TAB         Reported         17       Cyanocobalamin (Cyanocobalamin Injection) Unknown Strength Vial      IM ONCE, #1 VIAL         Reported         17       Glucosamine Hcl/Chondro Garcia A (GLUCOSAMINE-CHONDROITIN 500 mg/400 mg/10 ml)     Unknown Strength Liquid      PO QDAY, ML         Reported         17       Aspirin (Aspirin Baby *) Unknown Strength Tab.chew      PO QDAY, #30 TAB.CHEW 0 Refills         Reported         17       Naproxen Sodium (Aleve) 220 Mg Tab      1 TAB PO BID, TAB         Reported         9/21/10       (Provair)   No Conflict Check      1 - 2 PUFFS INH PRN         Reported         9/21/10       Levothyroxine Sodium (Levothroid*) 0.1 Mg Tablet      1 TAB PO QAM         Reported         9/21/10      Medications Reviewed:  No Changes made to meds            History and Present Illness      Past  Oncology Illness History      Mrs. Gale is a pleasant but complicated lady with remote history of lymphoma     which was diagnosed and treated in 2010 and she continues to be in complete     remission. More recently she had developed significant problems with anemia,     neutropenia as well as intermittent thrombocytopenia. Patient is known to have     joint problems as well as splenomegaly. She has been evaluated by bone marrow     transplant program at the [facility] by Dr. cadena and Dr. Wagner. It     was felt that patient has overlap autoimmune disorder even though she does not     have characteristic findings of rheumatoid arthritis or lupus. Because of the     transfusion dependent anemia she was started on Remicade therapy intravenously     on a weekly basis with significant improvement of her blood counts. She also     gets B12 injections for pernicious anemia. She is known to have hypothyroidism     because of which she is maintained on thyroid hormone replacement therapy.     Patient is an ex-smoker and because of significant smoking history she     underwent a screening CT scan of the chest in April 2018 which was negative but     patient was found to have emphysema. At the moment I do not have complete     records of the patient from [facility] which we are trying to     obtain. Her only complaint is lower back pain 5 out of 10 and fatigue.            Patient has complex autoimmune problems causing pancytopenia and splenomegaly.     She has been treated with Remicade with significant improvement of her anemia,     in fact she has become transfusion independent. We will repeat the blood work     today including autoimmune serology. We will try to obtain her old records     spastically most recent CT scan of the abdomen to document splenomegaly and to     make sure there is no significant lymphadenopathy for recurrent lymphoma. We     will try to obtain her records regarding her  lymphoma diagnosis as well. In the     meantime we will start her on B12 replacement injection therapy.            PAST, FAMILY   Past Medical History      Past Medical History:  No Diabetes Type 1, No Diabetes Type 2, Thyroid Disease,     No COPD, No Emphysema, No Hypertension, No Stroke, No High Cholesterol, No     Heart Attack, No Bleeding Condition, No Low or High RBC Count, No Low or High     WBC Count, No Low or High Platelet Coun, No Hepatitis, No Kidney Disease, No     Depression, No Alzheimer's Disease, No Mental Disease, No Seizures, Arthritis,     Osteoporosis, No Osteopenia, No Short of Air, No Sleep apnea, No Liver Disease,     No STD, No Enlarged Prostate, No Other      Hematology/oncology:  REPORTS HX OF: Anemia, Lymphoma, DENIES HX OF: Previous     Treatment for CA, Bladder Cancer, Blood cancer, Brain cancer, Breast cancer,     Cervical cancer, Coagulopathy, Colorectal cancer, Endocrine cancer, Eye cancer,     GI cancer,  cancer, Kidney cancer, Leukemia, Leukocytosis, Leukopenia, Liver     cancer, Lung cancer, Musculoskeletal cancer, Myeloma, Neurologic cancer, Oral     cancer, Ovarian cancer, Skin cancer, Stomach cancer, Thrombocytopenia, Thyroid     cancer, Uterine cancer, Other cancer history, Other hematologic history      Genetic/metabolic:  DENIES HX OF: Cystic fibrosis, Down syndrome, Other genetic     history, Other metabolic history            Past Surgical History      REPORTS HX OF: Biopsy (3 BONE MARROW BIOPSIES), Other Past Surgical Hx (OPEN     HEART SURGERY, TUBAL LIAGATION, SKIN GRAFT ON LEG, TONSILS REMOVED AND WISDOM     TEETH), DENIES HX OF: Cataract extraction, Thyroid surgery, Lung biopsy, CABG     surgery, Coronary stent, Valve replacement, Appendectomy, Cholecystectomy,     Splenectomy, Bladder surgery, Nephrectomy, Joint replacement, Frature repair,     Skin cancer removal, Melanoma excision, Spinal surgery, Breast biopsy,     Lumpectomy, Mastectomy, bilateral, Mastectomy,  right, Mastectomy, left,     Hysterectomy, Peg Tube Placement, VAD Placement            Family History      REPORTS HX OF: Anemia (SISTER), DENIES HX OF: Blood disorders, Blood Cancer,     Breast cancer, Cervical cancer, Coagulopathy, Colorectal cancer, Endocrine     Cancer, Eye Cancer, GI Cancer,  Cancer, Kidney Cancer, Leukemia, Leukocytosis    , Leukopenia, Liver Cancer, Lung cancer, Lymphoma, Melanoma, Musculoskeletal     Cancer, Myeloma, Neurologic Cancer, Oral Cancer, Ovarian cancer, Prostate cancer    , Skin Cancer, Stomach Cancer, Testicular Cancer, Thrombocytopenia, Thyroid     cancer, Uterine cancer, Other Cancer History, Other Hematology History            Social History      Lives independently:  No            Tobacco Use      Tobacco status:  Former smoker      Smoking packs/day:  1      Quit status:  Quit date established (11/2013)            Alcohol Use      Alcohol intake:  None            Substance Use      Substance use:  Denies use            REVIEW OF SYSTEMS      General:  Denies: Appetite change, Excessive sweating, Fatigue, Fever, Night     sweats, Weight gain, Weight loss, Other      Eyes:  Denies: Blurred vision, Corrective lenses, Diplopia, Eye irritation, Eye     pain, Eye redness, Spots in vision, Vision loss, Other      Ears, nose, mouth, throat:  Denies: Headache, Seizures, Visual Changes, Hearing     loss, Sinus Congestion, Hoarseness, Sore throat, Other      Cardiovascular:  Denies: Chest pain, Irregular heartbeat, Palpitations, Swollen     ankles/legs, Other      Respiratory:  Denies: Chest pain, Shortness of Air, Productive cough, Coughing     blood, Other      Gastrointestinal:  Denies: Nausea, Vomiting, Problem swallowing, Frequent     heartburn, Constipation, Diarrhea, Tarry stools, Bloody stools, Unable to     control bowels, Other      Kidney/Bladder:  Denies: Painful Urination, Change in urinary stream, Blood in     urine, Incontinence, Frequent Urination, Decreased urine  stream, Other      Musculoskeletal:  Denies: New Back pain, Leg Cramps, Painful Joints, Swollen     Joints, Muscle Pain, Muscle weakness, Other      Skin:  DENIES: Jaundice, Easy Bleeding, Lesions/changes in moles, Nail changes,     Skin Discoloration, Rash, Other      Neurological:  Denies: Dizziness, Fainting, Numbness\Tingling, Paralysis,     Seizures, Other      Psychiatric:  Complains of: AAO X 3, Denies: Anxiety, Panic attacks, Depression    , Memory loss, Other      Endocrine:  DiabetesThyroid DisorderOsteoporosisEndocrine Other      Hematologic/lymphatic:  Denies: Bruising, Bleeding, Enlarged Lymph Nodes,     Recurrent infections, Other      Reproductive:  Denies Pregnant, Denies Menopause, Denies Still Menstruating,     Denies Heavy Periods, Denies Other            VITAL SIGNS,PAIN/FATIGUE SCORE      Vitals      Height 5 ft 5.75 in / 167 cm      Weight 133 lbs 13.108 oz / 60.7 kg      BSA 1.68 m2      BMI 21.8 kg/m2      Temperature 98.6 F / 37 C - Temporal      Pulse 70      Blood Pressure 130/67 Sitting, Left Arm      Pulse Oximetry 100%, ROOM AIR            Pain Score      Experiencing any pain?:  No      Pain Scale Used:  Numerical      Pain Intensity:  0            Fatigue Score      Experiencing any fatigue?:  No      Fatigue (0-10 scale):  0 (none)            General Appearance:  Alert, Oriented X3, Cooperative      Eyes:  Anicteric Sclerae, Moist Conjunctiva, PERRLA      HEENT:  Orophraynx clear, No Erythema, No Exudates      Neck:  Supple, Full ROM, No Masses or JVD, No Carotid Bruits      Respiratory:  CTAB, No Diminished Breath, No Rales      Breast\Chest:  Symmetrical, No Nipple Discharge, No Masses      Abdomen\Gastro:  Soft, No NABS, No Masses, No Hernias, No Hemmorrhoids      Cardio:  RRR, No Murmur, No, Peripheral Edema, Normal PMI      Skin:  Normal Temperature, Normal Tone, Normal Texture and Turgor      Psychiatric:  Appropriate Affect, Intact Judgement, AAO x3      Neuro:  Cranial Nerve  II-XII Inta, No Focal Sensory Deficit      Muscularskeletal:  Normal Gait and Station, Full ROM of extremeties, Full     muscle strength\tone      Extremities:  No Digital Cyanosis, No Digital Ischemia, Pedal Pulses Intact,     Pedal Pulses Symetrical, Normal Gait and station, No Weakness      Lymphatic:  No Cervical, No Supraclavicular, No Infraclavicular, No Axillary,     No Inguinal            PREVENTION      Hx Influenza Vaccination:  Yes      Date Influenza Vaccine Given:  Oct 1, 2017      Influenza Vaccine Declined:  No      2 or More Falls Past Year?:  No      Fall Past Year with Injury?:  No      Hx Pneumococcal Vaccination:  Yes      Encouraged to follow-up with:  PCP regarding preventative exams.      Chart initiated by      CHLOÉ ABBOTT CMA            IMPRESSION/PLAN      Impression      -Non-Hodgkin's lymphoma      -diagnosed and treated in 2010 treated at the [facility]      -Farhan in remission.      -Check CBC, CMP, and LDH today.             -Thrombocytopenia       -Unknown etiology.  Likely multifactorial.        -Has splenomegaly.  Was worked up by BMT team at [place] and noted to have     overlap autoimmune disorder even though she does not have characteristic     findings of rheumatoid arthritis or lupus.       -Transfusion dependent            -Anemia.       -Multifactorial including Vitamin B12 deficiency, Thyroid deficiency.        -Was started on Remicade therapy intravenously on a weekly basis with     significant improvement of her blood counts.       She also gets B12 injections for pernicious anemia. She is known to have     hypothyroidism because of which she is maintained on thyroid hormone     replacement therapy.             -COPD      -No signs and symptoms of maligancy. History of smoking.       -Patient is an ex-smoker and because of significant smoking history she     underwent a screening CT scan of the chest in April 2018 which was negative but     patient was  found to have emphysema.       -At the moment I do not have complete records of the patient from [facility] which we are trying to obtain. Her only complaint is lower back pain     5 out of 10 and fatigue.            -Today's Evaluation      -Patient's imaging exams, blood tests, physicians' notes, and any new findings     since our last visit were reviewed today to reassess patient's medical     treatment plan.      -Old medical records were reviewed and summarized in chronological order in the     HPI today to maintain an updated medical record.       -Patient's radiology imaging tests from our last visit were reviewed     independently by me by direct visualization of the images.        -Patients current lab tests and medications were carefully reviewed to evaluate     patient's current treatment plan today.       -Patient was advised to call us right away if there are any new symptoms for an     urgent visit for further evaluation. Patient voiced understanding and agreed to     do so.            Plan      Check CBC and CMP      Get old records from [facility]      Continue vitamin B12 injections      Continue Feraheme      Return to clinic in 1 month            Diagnosis      Lymphoma - C85.90            Notes      New Diagnostics      * CBC, Routine       Dx: Lymphoma - C85.90      * Flow Cytometry Leukemia Lymph, Routine       Dx: Lymphoma - C85.90      * Sed Rate, Routine       Dx: Lymphoma - C85.90      * CMP Comp Metabolic Panel, Routine       Dx: Lymphoma - C85.90            Patient Education      Patient Education Provided:  Yes                 Disclaimer: Converted document may not contain table formatting or lab diagrams. Please see [employer] [facility] for the authenticated document.

## 2021-08-02 ENCOUNTER — OFFICE VISIT (OUTPATIENT)
Dept: ONCOLOGY | Facility: HOSPITAL | Age: 61
End: 2021-08-02

## 2021-08-02 VITALS
HEART RATE: 93 BPM | WEIGHT: 148.59 LBS | BODY MASS INDEX: 24.11 KG/M2 | TEMPERATURE: 97.4 F | RESPIRATION RATE: 16 BRPM | SYSTOLIC BLOOD PRESSURE: 136 MMHG | OXYGEN SATURATION: 100 % | DIASTOLIC BLOOD PRESSURE: 89 MMHG

## 2021-08-02 DIAGNOSIS — Z85.79: Primary | ICD-10-CM

## 2021-08-02 DIAGNOSIS — D47.2 MONOCLONAL GAMMOPATHY: ICD-10-CM

## 2021-08-02 PROCEDURE — 99213 OFFICE O/P EST LOW 20 MIN: CPT | Performed by: INTERNAL MEDICINE

## 2021-08-02 PROCEDURE — G0463 HOSPITAL OUTPT CLINIC VISIT: HCPCS

## 2021-08-02 RX ORDER — HYDROXYZINE HYDROCHLORIDE 25 MG/1
25 TABLET, FILM COATED ORAL 3 TIMES DAILY PRN
COMMUNITY

## 2021-08-02 RX ORDER — TRAZODONE HYDROCHLORIDE 100 MG/1
100 TABLET ORAL NIGHTLY
COMMUNITY

## 2021-08-02 RX ORDER — MELOXICAM 15 MG/1
15 TABLET ORAL DAILY
COMMUNITY

## 2021-08-02 RX ORDER — CETIRIZINE HYDROCHLORIDE 10 MG/1
10 TABLET ORAL DAILY
COMMUNITY

## 2021-08-02 RX ORDER — ALBUTEROL SULFATE 90 UG/1
2 AEROSOL, METERED RESPIRATORY (INHALATION) EVERY 4 HOURS PRN
COMMUNITY

## 2021-08-02 RX ORDER — ASPIRIN 81 MG/1
81 TABLET, CHEWABLE ORAL DAILY
COMMUNITY

## 2021-08-02 RX ORDER — CYCLOBENZAPRINE HCL 10 MG
10 TABLET ORAL 3 TIMES DAILY PRN
COMMUNITY

## 2021-08-02 NOTE — PROGRESS NOTES
Janice Gale   : 1960     LOCATION: [facility] HEMATOLOGY & ONCOLOGY     Chief Complaint  monoclonal gammopathy and Follow-up    Referring Provider: RACHEL Lin  PCP: Madelyn Suarez APRN    Oncology/Hematology History    No history exists.     Mrs. Gale is a pleasant but complicated lady with remote history of lymphoma     which was diagnosed and treated in  and she continues to be in complete     remission. More recently she had developed significant problems with anemia,     neutropenia as well as intermittent thrombocytopenia. Patient is known to have     joint problems as well as splenomegaly. She has been evaluated by bone marrow     transplant program at the [facility] by Dr. cadena and Dr. Wagner. It     was felt that patient has overlap autoimmune disorder even though she does not     have characteristic findings of rheumatoid arthritis or lupus. Because of the     transfusion dependent anemia she was started on Remicade therapy intravenously     on a weekly basis with significant improvement of her blood counts. She also     gets B12 injections for pernicious anemia. She is known to have hypothyroidism     because of which she is maintained on thyroid hormone replacement therapy.     Patient is an ex-smoker and because of significant smoking history she underwent    a screening CT scan of the chest in 2018 which was negative but patient     was found to have emphysema. At the moment I do not have complete records of the    patient from [facility] which we are trying to obtain. Her only     complaint is lower back pain 5 out of 10 and fatigue.            Patient has complex autoimmune problems causing pancytopenia and splenomegaly.     She has been treated with Remicade with significant improvement of her anemia,     in fact she has become transfusion independent. We will repeat the blood work     today including  autoimmune serology. We will try to obtain her old records     spastically most recent CT scan of the abdomen to document splenomegaly and to     make sure there is no significant lymphadenopathy for recurrent lymphoma.. In     the meantime we will start her on B12 replacement injection therapy.            [facility] - Hematology Interim      Patient reports that she received Rituxan x4 doses in 2017 for her diagnosis of     Waldenstroms      In 2018 she received the Remicade for the overlap autoimmune disorder causing     pancytopenia and splenomegaly       Shehad significant improvement in her blood counts which has been maintained     since discontinuing [id number]      She continues on monthly B12 injections          Subjective        History of Present Illness   Pt here for f/u   doing well   denies any fever or chills or URI  CBC done by outside lab 6/22/2021 white blood cell count is 4.43 hemoglobin 10.9 hematocrit 34.3 MCV is 101.5 platelet count of 130 differential ANC 2.59    Review of Systems   Constitutional: Positive for fatigue. Negative for appetite change, diaphoresis, fever, unexpected weight gain and unexpected weight loss.   HENT: Negative for hearing loss, sore throat and voice change.    Eyes: Negative for blurred vision, double vision, pain, redness and visual disturbance.   Respiratory: Negative for cough, shortness of breath and wheezing.    Cardiovascular: Negative for chest pain, palpitations and leg swelling.   Endocrine: Negative for cold intolerance, heat intolerance, polydipsia and polyuria.   Genitourinary: Negative for decreased urine volume, difficulty urinating, frequency and urinary incontinence.   Musculoskeletal: Negative for arthralgias, back pain, joint swelling and myalgias.   Skin: Negative for color change, rash, skin lesions and bruise.   Neurological: Negative for dizziness, seizures, numbness and headache.   Hematological: Negative for adenopathy. Does not bruise/bleed easily.    Psychiatric/Behavioral: Negative for depressed mood. The patient is not nervous/anxious.      Current Outpatient Medications on File Prior to Visit   Medication Sig Dispense Refill   • albuterol sulfate HFA (Proventil HFA) 108 (90 Base) MCG/ACT inhaler Inhale 2 puffs Every 4 (Four) Hours As Needed for Wheezing.     • aspirin 81 MG chewable tablet Chew 81 mg Daily.     • cetirizine (zyrTEC) 10 MG tablet Take 10 mg by mouth Daily.     • cyclobenzaprine (FLEXERIL) 10 MG tablet Take 10 mg by mouth 3 (Three) Times a Day As Needed for Muscle Spasms.     • hydrOXYzine (ATARAX) 25 MG tablet Take 25 mg by mouth 3 (Three) Times a Day As Needed for Itching.     • meloxicam (MOBIC) 15 MG tablet Take 15 mg by mouth Daily.     • Misc Natural Products (MULTI-VEGETABLE PO) Take  by mouth.     • traZODone (DESYREL) 100 MG tablet Take 100 mg by mouth Every Night.       No current facility-administered medications on file prior to visit.       No Known Allergies    Past Medical History:   Diagnosis Date   • Monoclonal gammopathy      History reviewed. No pertinent surgical history.  Social History     Socioeconomic History   • Marital status:      Spouse name: Not on file   • Number of children: Not on file   • Years of education: Not on file   • Highest education level: Not on file     History reviewed. No pertinent family history.  Immunization History   Administered Date(s) Administered   • COVID-19 (MODERNA) 03/31/2021       Objective     Vitals:    08/02/21 0950   BP: 136/89   Pulse: 93   Resp: 16   Temp: 97.4 °F (36.3 °C)   SpO2: 100%   Weight: 67.4 kg (148 lb 9.4 oz)   PainSc: 0-No pain     /89   Pulse 93   Temp 97.4 °F (36.3 °C)   Resp 16   Wt 67.4 kg (148 lb 9.4 oz)   SpO2 100%   BMI 24.11 kg/m²       Physical Exam    Deferred for discussion      No results found for: IRON, LABIRON, TRANSFERRIN, TIBC, FERRITIN, [id number], FOLATE  ECOG score: 0           PHQ-9 Total Score: 0         Result Review :   The  following data was reviewed by: Kellie Adrian MD on 08/02/2021:  No results found for: HGB, HCT, MCV, PLT, WBC, NEUTROABS, LYMPHSABS, MONOSABS, EOSABS, BASOSABS  Lab Results   Component Value Date    CREATININE 0.90 11/24/2020    CALCIUM 8.8 11/24/2020         Data reviewed: labs          Assessment and Plan      ASSESSMENT   Waldenstroms lymphoma  PLAN/RECOMMENDATIONS  Patient continues in clinical remission from her Waldestrom's  Her hemoglobin is stable and most recent labs are recorded above  Immunoglobulins done February 2021 was reviewed with patient  Her IgG is low but patient has no no evidence for frequent infections, thus replacement therapy is not required  And her IgM is within normal limits  She will continue to follow-up with rheumatology for her autoimmune disorder    Diagnoses and all orders for this visit:    1. Hx of Waldenstrom's macroglobulinemia (Primary)    2. Monoclonal gammopathy    I spent 20 minutes caring for Janice on this date of service. This time includes time spent by me in the following activities: reviewing tests, documenting information in the medical record and independently interpreting results and communicating that information with the patient/family/caregiver      Patient was given instructions and counseling regarding her condition or for health maintenance advice. Please see specific information pulled into the AVS if appropriate.     Kellie Adrian MD    8/2/2021

## 2021-08-05 ENCOUNTER — TRANSCRIBE ORDERS (OUTPATIENT)
Dept: ADMINISTRATIVE | Facility: HOSPITAL | Age: 61
End: 2021-08-05

## 2021-08-05 ENCOUNTER — LAB (OUTPATIENT)
Dept: LAB | Facility: HOSPITAL | Age: 61
End: 2021-08-05

## 2021-08-05 DIAGNOSIS — Z79.899 ENCOUNTER FOR LONG-TERM (CURRENT) USE OF OTHER MEDICATIONS: ICD-10-CM

## 2021-08-05 DIAGNOSIS — M81.0 SENILE OSTEOPOROSIS: Primary | ICD-10-CM

## 2021-08-05 DIAGNOSIS — M81.0 SENILE OSTEOPOROSIS: ICD-10-CM

## 2021-08-05 LAB
CALCIUM SPEC-SCNC: 8.7 MG/DL (ref 8.6–10.5)
CREAT SERPL-MCNC: 0.76 MG/DL (ref 0.57–1)
GFR SERPL CREATININE-BSD FRML MDRD: 77 ML/MIN/1.73

## 2021-08-05 PROCEDURE — 82306 VITAMIN D 25 HYDROXY: CPT

## 2021-08-05 PROCEDURE — 82310 ASSAY OF CALCIUM: CPT

## 2021-08-05 PROCEDURE — 82565 ASSAY OF CREATININE: CPT

## 2021-08-05 PROCEDURE — 36415 COLL VENOUS BLD VENIPUNCTURE: CPT

## 2021-08-06 LAB — 25(OH)D3 SERPL-MCNC: 66.1 NG/ML (ref 30–100)

## 2022-01-05 ENCOUNTER — TELEPHONE (OUTPATIENT)
Dept: ONCOLOGY | Facility: HOSPITAL | Age: 62
End: 2022-01-05

## 2022-01-05 NOTE — TELEPHONE ENCOUNTER
left a message with changed appt information - provider is not in the office on original appt date

## 2022-01-06 ENCOUNTER — TELEPHONE (OUTPATIENT)
Dept: ONCOLOGY | Facility: HOSPITAL | Age: 62
End: 2022-01-06

## 2022-02-07 ENCOUNTER — TELEPHONE (OUTPATIENT)
Dept: ONCOLOGY | Facility: OTHER | Age: 62
End: 2022-02-07

## 2022-02-07 NOTE — TELEPHONE ENCOUNTER
PATIENT HAD ACCIDENTALLY CANCELLED HER APPT ON 12/14/22. SPOKE WITH OFFICE WHO WAS ABLE TO PUT BACK ON THE SCHEDULE.  PATIENT INFORMED AND V/U

## 2022-02-14 ENCOUNTER — LAB (OUTPATIENT)
Dept: ONCOLOGY | Facility: HOSPITAL | Age: 62
End: 2022-02-14

## 2022-02-14 ENCOUNTER — OFFICE VISIT (OUTPATIENT)
Dept: ONCOLOGY | Facility: HOSPITAL | Age: 62
End: 2022-02-14

## 2022-02-14 ENCOUNTER — APPOINTMENT (OUTPATIENT)
Dept: ONCOLOGY | Facility: HOSPITAL | Age: 62
End: 2022-02-14

## 2022-02-14 VITALS
DIASTOLIC BLOOD PRESSURE: 82 MMHG | BODY MASS INDEX: 23.97 KG/M2 | RESPIRATION RATE: 18 BRPM | WEIGHT: 147.71 LBS | HEART RATE: 92 BPM | SYSTOLIC BLOOD PRESSURE: 134 MMHG | TEMPERATURE: 97.8 F | OXYGEN SATURATION: 99 %

## 2022-02-14 DIAGNOSIS — D47.2 MONOCLONAL GAMMOPATHY: Primary | ICD-10-CM

## 2022-02-14 DIAGNOSIS — D47.2 MONOCLONAL GAMMOPATHY: ICD-10-CM

## 2022-02-14 PROBLEM — G47.00 INSOMNIA: Status: ACTIVE | Noted: 2019-03-07

## 2022-02-14 PROBLEM — D64.9 ANEMIA: Status: ACTIVE | Noted: 2018-02-08

## 2022-02-14 PROBLEM — M81.0 OSTEOPOROSIS, POSTMENOPAUSAL: Status: ACTIVE | Noted: 2021-01-20

## 2022-02-14 PROBLEM — Z86.73 HISTORY OF TRANSIENT ISCHEMIC ATTACK: Status: ACTIVE | Noted: 2020-12-28

## 2022-02-14 PROBLEM — K76.9 DISEASE OF LIVER: Status: ACTIVE | Noted: 2018-02-08

## 2022-02-14 PROBLEM — J30.9 ALLERGIC RHINITIS: Status: ACTIVE | Noted: 2020-12-28

## 2022-02-14 PROBLEM — M81.0 OSTEOPOROSIS: Status: ACTIVE | Noted: 2020-12-28

## 2022-02-14 PROBLEM — E53.8 DISORDER OF VITAMIN B12: Status: ACTIVE | Noted: 2018-02-08

## 2022-02-14 PROBLEM — M19.90 OSTEOARTHRITIS: Status: ACTIVE | Noted: 2021-01-20

## 2022-02-14 PROBLEM — E03.9 HYPOTHYROIDISM: Status: ACTIVE | Noted: 2018-02-08

## 2022-02-14 PROBLEM — C85.90 NON-HODGKIN'S LYMPHOMA (HCC): Status: ACTIVE | Noted: 2018-02-08

## 2022-02-14 PROBLEM — C82.50: Status: ACTIVE | Noted: 2018-02-08

## 2022-02-14 LAB
ALBUMIN SERPL-MCNC: 4.15 G/DL (ref 3.5–5.2)
ALBUMIN/GLOB SERPL: 2.5 G/DL
ALP SERPL-CCNC: 74 U/L (ref 39–117)
ALT SERPL W P-5'-P-CCNC: 10 U/L (ref 1–33)
ANION GAP SERPL CALCULATED.3IONS-SCNC: 6.9 MMOL/L (ref 5–15)
AST SERPL-CCNC: 19 U/L (ref 1–32)
BASOPHILS # BLD AUTO: 0.03 10*3/MM3 (ref 0–0.2)
BASOPHILS NFR BLD AUTO: 0.5 % (ref 0–1.5)
BILIRUB SERPL-MCNC: 0.6 MG/DL (ref 0–1.2)
BUN SERPL-MCNC: 10 MG/DL (ref 8–23)
BUN/CREAT SERPL: 15.4 (ref 7–25)
CALCIUM SPEC-SCNC: 9.2 MG/DL (ref 8.6–10.5)
CHLORIDE SERPL-SCNC: 100 MMOL/L (ref 98–107)
CO2 SERPL-SCNC: 25.1 MMOL/L (ref 22–29)
CREAT SERPL-MCNC: 0.65 MG/DL (ref 0.57–1)
DEPRECATED RDW RBC AUTO: 43 FL (ref 37–54)
EOSINOPHIL # BLD AUTO: 0.24 10*3/MM3 (ref 0–0.4)
EOSINOPHIL NFR BLD AUTO: 3.9 % (ref 0.3–6.2)
ERYTHROCYTE [DISTWIDTH] IN BLOOD BY AUTOMATED COUNT: 12.6 % (ref 12.3–15.4)
GFR SERPL CREATININE-BSD FRML MDRD: 92 ML/MIN/1.73
GLOBULIN UR ELPH-MCNC: 1.7 GM/DL
GLUCOSE SERPL-MCNC: 96 MG/DL (ref 65–99)
HCT VFR BLD AUTO: 36.1 % (ref 34–46.6)
HGB BLD-MCNC: 12.2 G/DL (ref 12–15.9)
IGA1 MFR SER: 11 MG/DL (ref 70–400)
IGG1 SER-MCNC: 312 MG/DL (ref 700–1600)
IGM SERPL-MCNC: 94 MG/DL (ref 40–230)
IMM GRANULOCYTES # BLD AUTO: 0.02 10*3/MM3 (ref 0–0.05)
IMM GRANULOCYTES NFR BLD AUTO: 0.3 % (ref 0–0.5)
LYMPHOCYTES # BLD AUTO: 1.22 10*3/MM3 (ref 0.7–3.1)
LYMPHOCYTES NFR BLD AUTO: 20 % (ref 19.6–45.3)
MCH RBC QN AUTO: 31.6 PG (ref 26.6–33)
MCHC RBC AUTO-ENTMCNC: 33.8 G/DL (ref 31.5–35.7)
MCV RBC AUTO: 93.5 FL (ref 79–97)
MONOCYTES # BLD AUTO: 0.37 10*3/MM3 (ref 0.1–0.9)
MONOCYTES NFR BLD AUTO: 6.1 % (ref 5–12)
NEUTROPHILS NFR BLD AUTO: 4.21 10*3/MM3 (ref 1.7–7)
NEUTROPHILS NFR BLD AUTO: 69.2 % (ref 42.7–76)
PLATELET # BLD AUTO: 134 10*3/MM3 (ref 140–450)
PMV BLD AUTO: 8.6 FL (ref 6–12)
POTASSIUM SERPL-SCNC: 4.5 MMOL/L (ref 3.5–5.2)
PROT SERPL-MCNC: 5.8 G/DL (ref 6–8.5)
RBC # BLD AUTO: 3.86 10*6/MM3 (ref 3.77–5.28)
SODIUM SERPL-SCNC: 132 MMOL/L (ref 136–145)
WBC NRBC COR # BLD: 6.09 10*3/MM3 (ref 3.4–10.8)

## 2022-02-14 PROCEDURE — 82784 ASSAY IGA/IGD/IGG/IGM EACH: CPT

## 2022-02-14 PROCEDURE — 85025 COMPLETE CBC W/AUTO DIFF WBC: CPT

## 2022-02-14 PROCEDURE — 99213 OFFICE O/P EST LOW 20 MIN: CPT | Performed by: INTERNAL MEDICINE

## 2022-02-14 PROCEDURE — G0463 HOSPITAL OUTPT CLINIC VISIT: HCPCS

## 2022-02-14 PROCEDURE — 80053 COMPREHEN METABOLIC PANEL: CPT

## 2022-02-14 PROCEDURE — 36415 COLL VENOUS BLD VENIPUNCTURE: CPT

## 2022-02-14 RX ORDER — CYANOCOBALAMIN 1000 UG/ML
1 INJECTION, SOLUTION INTRAMUSCULAR; SUBCUTANEOUS
COMMUNITY

## 2022-02-14 RX ORDER — ALENDRONATE SODIUM 70 MG/1
TABLET ORAL
COMMUNITY

## 2022-02-14 RX ORDER — NITROFURANTOIN 25; 75 MG/1; MG/1
CAPSULE ORAL
COMMUNITY

## 2022-02-14 RX ORDER — LEVOTHYROXINE SODIUM 0.1 MG/1
TABLET ORAL
COMMUNITY

## 2022-02-14 NOTE — PROGRESS NOTES
Janice Gale   : 1960     LOCATION: [facility] HEMATOLOGY & ONCOLOGY     Chief Complaint  monoclonal Grammopthy    Referring Provider: MARILY Braswell  PCP: Lynette Dumont APRN    Oncology/Hematology History    No history exists.     Chief Complaint  monoclonal gammopathy and Follow-up     Referring Provider: RACHEL Lin  PCP: Madelyn Suarez APRN         Oncology/Hematology History     No history exists.      Mrs. Gale is a pleasant but complicated lady with remote history of lymphoma     which was diagnosed and treated in  and she continues to be in complete     remission. More recently she had developed significant problems with anemia,     neutropenia as well as intermittent thrombocytopenia. Patient is known to have     joint problems as well as splenomegaly. She has been evaluated by bone marrow     transplant program at the [facility] by Dr. cadena and Dr. Wagner. It     was felt that patient has overlap autoimmune disorder even though she does not     have characteristic findings of rheumatoid arthritis or lupus. Because of the     transfusion dependent anemia she was started on Remicade therapy intravenously     on a weekly basis with significant improvement of her blood counts. She also     gets B12 injections for pernicious anemia. She is known to have hypothyroidism     because of which she is maintained on thyroid hormone replacement therapy.     Patient is an ex-smoker and because of significant smoking history she underwent    a screening CT scan of the chest in 2018 which was negative but patient     was found to have emphysema. At the moment I do not have complete records of the    patient from [facility] which we are trying to obtain. Her only     complaint is lower back pain 5 out of 10 and fatigue.            Patient has complex autoimmune problems causing pancytopenia and splenomegaly.      She has been treated with Remicade with significant improvement of her anemia,     in fact she has become transfusion independent. We will repeat the blood work     today including autoimmune serology. We will try to obtain her old records     spastically most recent CT scan of the abdomen to document splenomegaly and to     make sure there is no significant lymphadenopathy for recurrent lymphoma.. In     the meantime we will start her on B12 replacement injection therapy.            HPI - Hematology Interim      Patient reports that she received Rituxan x4 doses in 2017 for her diagnosis of     Waldenstroms      In 2018 she received the Remicade for the overlap autoimmune disorder causing     pancytopenia and splenomegaly       Shehad significant improvement in her blood counts which has been maintained     since discontinuing [id number]      She continues on monthly B12 injections             Subjective        History of Present Illness   Pt reports that she is feeling well  Denies any weight loss   denies any bone pain  Review of Systems   Constitutional: Positive for fatigue. Negative for appetite change, diaphoresis, fever, unexpected weight gain and unexpected weight loss.   HENT: Negative for hearing loss, sore throat and voice change.    Eyes: Negative for blurred vision, double vision, pain, redness and visual disturbance.   Respiratory: Negative for cough, shortness of breath and wheezing.    Cardiovascular: Negative for chest pain, palpitations and leg swelling.   Endocrine: Negative for cold intolerance, heat intolerance, polydipsia and polyuria.   Genitourinary: Negative for decreased urine volume, difficulty urinating, frequency and urinary incontinence.   Musculoskeletal: Negative for arthralgias, back pain, joint swelling and myalgias.   Skin: Negative for color change, rash, skin lesions and wound.   Neurological: Negative for dizziness, seizures, numbness and headache.   Hematological:  Negative for adenopathy. Does not bruise/bleed easily.   Psychiatric/Behavioral: Negative for depressed mood. The patient is not nervous/anxious.    All other systems reviewed and are negative.    Current Outpatient Medications on File Prior to Visit   Medication Sig Dispense Refill   • albuterol sulfate HFA (Proventil HFA) 108 (90 Base) MCG/ACT inhaler Inhale 2 puffs Every 4 (Four) Hours As Needed for Wheezing.     • alendronate (FOSAMAX) 70 MG tablet alendronate 70 mg tablet   TAKE 1 TABLET BY MOUTH ONCE WEEKLY     • aspirin 81 MG chewable tablet Chew 81 mg Daily.     • cetirizine (zyrTEC) 10 MG tablet Take 10 mg by mouth Daily.     • Cholecalciferol 50 MCG (2000 UT) capsule cholecalciferol (vitamin D3) 50 mcg (2,000 unit) capsule     • cyanocobalamin 1000 MCG/ML injection 1 mL.     • cyclobenzaprine (FLEXERIL) 10 MG tablet Take 10 mg by mouth 3 (Three) Times a Day As Needed for Muscle Spasms.     • denosumab (PROLIA) 60 MG/ML solution prefilled syringe syringe Inject 60 mg under the skin into the appropriate area as directed.     • hydrOXYzine (ATARAX) 25 MG tablet Take 25 mg by mouth 3 (Three) Times a Day As Needed for Itching.     • levothyroxine (SYNTHROID, LEVOTHROID) 100 MCG tablet levothyroxine 100 mcg tablet     • meloxicam (MOBIC) 15 MG tablet Take 15 mg by mouth Daily.     • Misc Natural Products (MULTI-VEGETABLE PO) Take  by mouth.     • nitrofurantoin, macrocrystal-monohydrate, (MACROBID) 100 MG capsule nitrofurantoin monohydrate/macrocrystals 100 mg capsule     • traZODone (DESYREL) 100 MG tablet Take 100 mg by mouth Every Night.       No current facility-administered medications on file prior to visit.       Allergies   Allergen Reactions   • Levofloxacin Swelling       Past Medical History:   Diagnosis Date   • Monoclonal gammopathy      History reviewed. No pertinent surgical history.  Social History     Socioeconomic History   • Marital status:      History reviewed. No pertinent family  history.  Immunization History   Administered Date(s) Administered   • COVID-19 (MODERNA) 1st, 2nd, 3rd Dose Only 03/31/2021, 11/21/2021   • COVID-19 (UNSPECIFIED) 03/03/2021, 03/31/2021   • DTaP 12/22/2020   • FLUAD TRI 65YR+ 09/25/2020   • Flu Vaccine Quad PF >18YRS 10/21/2019, 10/08/2021   • Influenza Split Preservative Free ID 09/25/2020   • Pneumococcal Polysaccharide (PPSV23) 01/01/2016   • Tdap 12/28/2020       Objective     Vitals:    02/14/22 1140   BP: 134/82   Pulse: 92   Resp: 18   Temp: 97.8 °F (36.6 °C)   SpO2: 99%   Weight: 67 kg (147 lb 11.3 oz)   PainSc: 0-No pain     Body mass index is 23.97 kg/m².     Physical Exam  Constitutional:       Appearance: Normal appearance.   HENT:      Head: Normocephalic and atraumatic.      Nose: Nose normal.      Mouth/Throat:      Mouth: Mucous membranes are moist.   Eyes:      Pupils: Pupils are equal, round, and reactive to light.   Cardiovascular:      Rate and Rhythm: Normal rate.   Pulmonary:      Effort: Pulmonary effort is normal.   Musculoskeletal:         General: Normal range of motion.      Cervical back: Normal range of motion.   Skin:     General: Skin is warm and dry.   Neurological:      General: No focal deficit present.      Mental Status: She is alert.   Psychiatric:         Mood and Affect: Mood normal.               No results found for: IRON, LABIRON, TRANSFERRIN, TIBC, FERRITIN, [id number], FOLATE  ECOG score: 0           PHQ-9 Total Score: 0         Result Review :   The following data was reviewed by: Kellie Adrian MD on 02/14/2022:  No results found for: HGB, HCT, MCV, PLT, WBC, NEUTROABS, LYMPHSABS, MONOSABS, EOSABS, BASOSABS  Lab Results   Component Value Date    CREATININE 0.76 08/05/2021    CALCIUM 8.7 08/05/2021         Data reviewed: labs          Assessment and Plan      ASSESSMENT   monoclonal gammopathy  PLAN/RECOMMENDATIONS  Labs today as ordered   clinically stable with no evidence for progressive symptoms   f/u 12  months    Diagnoses and all orders for this visit:    1. Monoclonal gammopathy (Primary)  -     [facility] Protein, Total, PE, and Kappa/Lambda Light Chains, Free w/Ratio; Future  -     CBC & Differential; Future  -     Comprehensive Metabolic Panel; Future  -     IgG, IgA, IgM; Future  -     CBC & Differential; Future  -     [facility] Protein, Total, PE, and Kappa/Lambda Light Chains, Free w/Ratio; Future  -     IgG, IgA, IgM; Future  -     Comprehensive Metabolic Panel; Future      I spent 20 minutes caring for Janice on this date of service. This time includes time spent by me in the following activities: reviewing tests, performing a medically appropriate examination and/or evaluation, counseling and educating the patient/family/caregiver, ordering medications, tests, or procedures, documenting information in the medical record and independently interpreting results and communicating that information with the patient/family/caregiver    Patient was given instructions and counseling regarding her condition or for health maintenance advice. Please see specific information pulled into the AVS if appropriate.     Kellie Adrian MD    2/14/2022

## 2022-02-18 ENCOUNTER — TELEPHONE (OUTPATIENT)
Dept: ONCOLOGY | Facility: HOSPITAL | Age: 62
End: 2022-02-18

## 2022-02-18 DIAGNOSIS — D47.2 MONOCLONAL GAMMOPATHY: Primary | ICD-10-CM

## 2022-02-18 NOTE — TELEPHONE ENCOUNTER
Caller: HERIBERTO    Relationship to patient: SELF    Best call back number: [phone]    Patient is needing: TO SPEAK WITH NURSE ABOUT LAB RESULTS.

## 2022-02-25 ENCOUNTER — TELEPHONE (OUTPATIENT)
Dept: ONCOLOGY | Facility: HOSPITAL | Age: 62
End: 2022-02-25

## 2022-02-25 NOTE — TELEPHONE ENCOUNTER
Caller: Janice Gale    Relationship: Self    Best call back number: [phone]      Who are you requesting to speak with (clinical staff, provider,  specific staff member): CLINICAL      What was the call regarding: LAB RESULTS FOR HER COMPONENT LEVELS WAS LOW AND PATIENT WAS WANTING TO KNOW IF THERE WAS ANYTHING WRONG    Do you require a callback: YES

## 2022-02-25 NOTE — TELEPHONE ENCOUNTER
Please let her know that we will continue to monitor the labs  off therapy    The numbers were a little different but not concerning

## 2022-02-28 NOTE — TELEPHONE ENCOUNTER
Spoke with patient and informed her that although her numbers have changed, they are not reason for her to be concerned we will continue to monitor her labs per Dr. Bowman. Patient verbalized her understanding.

## 2022-03-17 ENCOUNTER — TRANSCRIBE ORDERS (OUTPATIENT)
Dept: ADMINISTRATIVE | Facility: HOSPITAL | Age: 62
End: 2022-03-17

## 2022-03-17 DIAGNOSIS — Z12.31 ENCOUNTER FOR SCREENING MAMMOGRAM FOR MALIGNANT NEOPLASM OF BREAST: Primary | ICD-10-CM

## 2022-03-24 ENCOUNTER — HOSPITAL ENCOUNTER (OUTPATIENT)
Dept: MAMMOGRAPHY | Facility: HOSPITAL | Age: 62
Discharge: HOME | End: 2022-03-24
Admitting: NURSE PRACTITIONER

## 2022-03-24 DIAGNOSIS — Z12.31 ENCOUNTER FOR SCREENING MAMMOGRAM FOR MALIGNANT NEOPLASM OF BREAST: ICD-10-CM

## 2022-03-24 PROCEDURE — 77067 SCR MAMMO BI INCL CAD: CPT

## 2022-04-26 ENCOUNTER — LAB (OUTPATIENT)
Dept: LAB | Facility: HOSPITAL | Age: 62
End: 2022-04-26

## 2022-04-26 DIAGNOSIS — Z79.899 ENCOUNTER FOR LONG-TERM (CURRENT) USE OF OTHER MEDICATIONS: ICD-10-CM

## 2022-04-26 DIAGNOSIS — M81.0 SENILE OSTEOPOROSIS: ICD-10-CM

## 2022-04-26 LAB
25(OH)D3 SERPL-MCNC: 61.1 NG/ML (ref 30–100)
CALCIUM SPEC-SCNC: 8.7 MG/DL (ref 8.6–10.5)
CREAT SERPL-MCNC: 0.75 MG/DL (ref 0.57–1)
EGFRCR SERPLBLD CKD-EPI 2021: 90.1 ML/MIN/1.73

## 2022-04-26 PROCEDURE — 86334 IMMUNOFIX E-PHORESIS SERUM: CPT

## 2022-04-26 PROCEDURE — 83521 IG LIGHT CHAINS FREE EACH: CPT

## 2022-04-26 PROCEDURE — 82310 ASSAY OF CALCIUM: CPT

## 2022-04-26 PROCEDURE — 82306 VITAMIN D 25 HYDROXY: CPT

## 2022-04-26 PROCEDURE — 84165 PROTEIN E-PHORESIS SERUM: CPT

## 2022-04-26 PROCEDURE — 36415 COLL VENOUS BLD VENIPUNCTURE: CPT

## 2022-04-26 PROCEDURE — 82565 ASSAY OF CREATININE: CPT

## 2022-04-26 PROCEDURE — 82784 ASSAY IGA/IGD/IGG/IGM EACH: CPT

## 2022-04-26 PROCEDURE — 84155 ASSAY OF PROTEIN SERUM: CPT

## 2022-04-28 LAB
ALBUMIN SERPL ELPH-MCNC: 3.5 G/DL (ref 2.9–4.4)
ALBUMIN/GLOB SERPL: 1.8 {RATIO} (ref 0.7–1.7)
ALPHA1 GLOB SERPL ELPH-MCNC: 0.3 G/DL (ref 0–0.4)
ALPHA2 GLOB SERPL ELPH-MCNC: 0.5 G/DL (ref 0.4–1)
B-GLOBULIN SERPL ELPH-MCNC: 0.8 G/DL (ref 0.7–1.3)
GAMMA GLOB SERPL ELPH-MCNC: 0.3 G/DL (ref 0.4–1.8)
GLOBULIN SER-MCNC: 2 G/DL (ref 2.2–3.9)
IGA SERPL-MCNC: 13 MG/DL (ref 87–352)
IGG SERPL-MCNC: 325 MG/DL (ref 586–1602)
IGM SERPL-MCNC: 95 MG/DL (ref 26–217)
INTERPRETATION SERPL IEP-IMP: ABNORMAL
KAPPA LC FREE SER-MCNC: 19.2 MG/L (ref 3.3–19.4)
KAPPA LC FREE/LAMBDA FREE SER: 0.9 {RATIO} (ref 0.26–1.65)
LABORATORY COMMENT REPORT: ABNORMAL
LAMBDA LC FREE SERPL-MCNC: 21.4 MG/L (ref 5.7–26.3)
M PROTEIN SERPL ELPH-MCNC: ABNORMAL G/DL
PROT SERPL-MCNC: 5.5 G/DL (ref 6–8.5)

## 2022-05-03 ENCOUNTER — TRANSCRIBE ORDERS (OUTPATIENT)
Dept: ADMINISTRATIVE | Facility: HOSPITAL | Age: 62
End: 2022-05-03

## 2022-05-03 DIAGNOSIS — Z78.0 POST-MENOPAUSAL: ICD-10-CM

## 2022-05-03 DIAGNOSIS — M81.0 OSTEOPOROSIS, POST-MENOPAUSAL: Primary | ICD-10-CM

## 2022-11-14 ENCOUNTER — HOSPITAL ENCOUNTER (OUTPATIENT)
Dept: BONE DENSITY | Facility: HOSPITAL | Age: 62
Discharge: HOME | End: 2022-11-14
Admitting: INTERNAL MEDICINE

## 2022-11-14 DIAGNOSIS — Z78.0 POST-MENOPAUSAL: ICD-10-CM

## 2022-11-14 DIAGNOSIS — M81.0 OSTEOPOROSIS, POST-MENOPAUSAL: ICD-10-CM

## 2022-11-14 PROCEDURE — 77080 DXA BONE DENSITY AXIAL: CPT

## 2022-11-21 ENCOUNTER — LAB (OUTPATIENT)
Dept: LAB | Facility: HOSPITAL | Age: 62
End: 2022-11-21

## 2022-11-21 ENCOUNTER — TRANSCRIBE ORDERS (OUTPATIENT)
Dept: ADMINISTRATIVE | Facility: HOSPITAL | Age: 62
End: 2022-11-21

## 2022-11-21 DIAGNOSIS — E55.9 AVITAMINOSIS D: ICD-10-CM

## 2022-11-21 DIAGNOSIS — M81.0 SENILE OSTEOPOROSIS: ICD-10-CM

## 2022-11-21 DIAGNOSIS — Z79.899 ENCOUNTER FOR LONG-TERM (CURRENT) USE OF OTHER MEDICATIONS: ICD-10-CM

## 2022-11-21 DIAGNOSIS — M81.0 SENILE OSTEOPOROSIS: Primary | ICD-10-CM

## 2022-11-21 LAB
25(OH)D3 SERPL-MCNC: 71.1 NG/ML (ref 30–100)
CALCIUM SPEC-SCNC: 9.2 MG/DL (ref 8.6–10.5)
CREAT SERPL-MCNC: 0.81 MG/DL (ref 0.57–1)
EGFRCR SERPLBLD CKD-EPI 2021: 81.7 ML/MIN/1.73

## 2022-11-21 PROCEDURE — 36415 COLL VENOUS BLD VENIPUNCTURE: CPT

## 2022-11-21 PROCEDURE — 82306 VITAMIN D 25 HYDROXY: CPT

## 2022-11-21 PROCEDURE — 82310 ASSAY OF CALCIUM: CPT

## 2022-11-21 PROCEDURE — 82565 ASSAY OF CREATININE: CPT

## 2023-02-10 ENCOUNTER — TELEPHONE (OUTPATIENT)
Dept: ONCOLOGY | Facility: HOSPITAL | Age: 63
End: 2023-02-10

## 2023-02-10 NOTE — TELEPHONE ENCOUNTER
Caller: Janice Gale    Relationship: Self    Best call back number: [phone]        What was the call regarding: PT CALLED TO CANCEL APPOINTMENT AND WILL CALL BACK TO RESCHEDULE

## 2023-03-22 ENCOUNTER — TRANSCRIBE ORDERS (OUTPATIENT)
Dept: ADMINISTRATIVE | Facility: HOSPITAL | Age: 63
End: 2023-03-22
Payer: COMMERCIAL

## 2023-03-22 DIAGNOSIS — Z12.31 VISIT FOR SCREENING MAMMOGRAM: Primary | ICD-10-CM

## 2023-05-08 ENCOUNTER — HOSPITAL ENCOUNTER (OUTPATIENT)
Dept: MAMMOGRAPHY | Facility: HOSPITAL | Age: 63
Discharge: HOME | End: 2023-05-08
Admitting: NURSE PRACTITIONER
Payer: COMMERCIAL

## 2023-05-08 DIAGNOSIS — Z12.31 VISIT FOR SCREENING MAMMOGRAM: ICD-10-CM

## 2023-05-08 PROCEDURE — 77067 SCR MAMMO BI INCL CAD: CPT

## 2023-05-08 PROCEDURE — 77063 BREAST TOMOSYNTHESIS BI: CPT

## 2023-06-01 ENCOUNTER — TRANSCRIBE ORDERS (OUTPATIENT)
Dept: ADMINISTRATIVE | Facility: HOSPITAL | Age: 63
End: 2023-06-01

## 2023-06-01 ENCOUNTER — LAB (OUTPATIENT)
Dept: LAB | Facility: HOSPITAL | Age: 63
End: 2023-06-01
Payer: COMMERCIAL

## 2023-06-01 DIAGNOSIS — M81.0 SENILE OSTEOPOROSIS: Primary | ICD-10-CM

## 2023-06-01 DIAGNOSIS — M81.0 SENILE OSTEOPOROSIS: ICD-10-CM

## 2023-06-01 DIAGNOSIS — Z79.899 ENCOUNTER FOR LONG-TERM (CURRENT) USE OF OTHER MEDICATIONS: ICD-10-CM

## 2023-06-01 LAB
CREAT UR-MCNC: 59.7 MG/DL
PROT ?TM UR-MCNC: 6.6 MG/DL
PROT/CREAT UR: 0.11 MG/G{CREAT}

## 2023-06-01 PROCEDURE — 82306 VITAMIN D 25 HYDROXY: CPT

## 2023-06-01 PROCEDURE — 82570 ASSAY OF URINE CREATININE: CPT

## 2023-06-01 PROCEDURE — 36415 COLL VENOUS BLD VENIPUNCTURE: CPT

## 2023-06-01 PROCEDURE — 84156 ASSAY OF PROTEIN URINE: CPT

## 2023-06-01 PROCEDURE — 82310 ASSAY OF CALCIUM: CPT

## 2023-06-02 LAB
25(OH)D3 SERPL-MCNC: 48.4 NG/ML (ref 30–100)
CALCIUM SPEC-SCNC: 9 MG/DL (ref 8.6–10.5)

## 2023-11-16 ENCOUNTER — LAB (OUTPATIENT)
Dept: LAB | Facility: HOSPITAL | Age: 63
End: 2023-11-16
Payer: COMMERCIAL

## 2023-11-16 ENCOUNTER — TRANSCRIBE ORDERS (OUTPATIENT)
Dept: LAB | Facility: HOSPITAL | Age: 63
End: 2023-11-16
Payer: COMMERCIAL

## 2023-11-16 DIAGNOSIS — E55.9 VITAMIN D DEFICIENCY: ICD-10-CM

## 2023-11-16 DIAGNOSIS — Z79.899 ENCOUNTER FOR LONG-TERM (CURRENT) USE OF OTHER MEDICATIONS: Primary | ICD-10-CM

## 2023-11-16 DIAGNOSIS — Z79.899 ENCOUNTER FOR LONG-TERM (CURRENT) USE OF OTHER MEDICATIONS: ICD-10-CM

## 2023-11-16 LAB
25(OH)D3 SERPL-MCNC: 48.1 NG/ML (ref 30–100)
CALCIUM SPEC-SCNC: 9 MG/DL (ref 8.6–10.5)
CREAT SERPL-MCNC: 0.93 MG/DL (ref 0.57–1)
EGFRCR SERPLBLD CKD-EPI 2021: 68.8 ML/MIN/1.73

## 2023-11-16 PROCEDURE — 36415 COLL VENOUS BLD VENIPUNCTURE: CPT

## 2023-11-16 PROCEDURE — 82306 VITAMIN D 25 HYDROXY: CPT

## 2023-11-16 PROCEDURE — 82310 ASSAY OF CALCIUM: CPT

## 2023-11-16 PROCEDURE — 82565 ASSAY OF CREATININE: CPT

## 2024-06-11 ENCOUNTER — HOSPITAL ENCOUNTER (OUTPATIENT)
Dept: GENERAL RADIOLOGY | Facility: HOSPITAL | Age: 64
Discharge: HOME | End: 2024-06-11
Admitting: NURSE PRACTITIONER
Payer: MEDICARE

## 2024-06-11 ENCOUNTER — TRANSCRIBE ORDERS (OUTPATIENT)
Dept: LAB | Facility: HOSPITAL | Age: 64
End: 2024-06-11
Payer: COMMERCIAL

## 2024-06-11 DIAGNOSIS — M54.30 SCIATICA, UNSPECIFIED LATERALITY: Primary | ICD-10-CM

## 2024-06-11 DIAGNOSIS — M54.30 SCIATICA, UNSPECIFIED LATERALITY: ICD-10-CM

## 2024-06-11 PROCEDURE — 72100 X-RAY EXAM L-S SPINE 2/3 VWS: CPT

## 2024-06-18 ENCOUNTER — TRANSCRIBE ORDERS (OUTPATIENT)
Dept: ADMINISTRATIVE | Facility: HOSPITAL | Age: 64
End: 2024-06-18
Payer: COMMERCIAL

## 2024-06-18 DIAGNOSIS — Z12.31 SCREENING MAMMOGRAM FOR HIGH-RISK PATIENT: Primary | ICD-10-CM

## 2024-06-25 ENCOUNTER — LAB (OUTPATIENT)
Dept: LAB | Facility: HOSPITAL | Age: 64
End: 2024-06-25
Payer: MEDICARE

## 2024-06-25 ENCOUNTER — TRANSCRIBE ORDERS (OUTPATIENT)
Dept: ADMINISTRATIVE | Facility: HOSPITAL | Age: 64
End: 2024-06-25
Payer: COMMERCIAL

## 2024-06-25 DIAGNOSIS — Z79.899 ENCOUNTER FOR LONG-TERM (CURRENT) USE OF OTHER MEDICATIONS: ICD-10-CM

## 2024-06-25 DIAGNOSIS — M81.0 OSTEOPOROSIS, POST-MENOPAUSAL: ICD-10-CM

## 2024-06-25 DIAGNOSIS — M81.0 OSTEOPOROSIS, POST-MENOPAUSAL: Primary | ICD-10-CM

## 2024-06-25 LAB
25(OH)D3 SERPL-MCNC: 52.9 NG/ML (ref 30–100)
CALCIUM SPEC-SCNC: 8.7 MG/DL (ref 8.6–10.5)
CREAT SERPL-MCNC: 0.83 MG/DL (ref 0.57–1)
EGFRCR SERPLBLD CKD-EPI 2021: 78.3 ML/MIN/1.73

## 2024-06-25 PROCEDURE — 82306 VITAMIN D 25 HYDROXY: CPT

## 2024-06-25 PROCEDURE — 36415 COLL VENOUS BLD VENIPUNCTURE: CPT

## 2024-06-25 PROCEDURE — 82310 ASSAY OF CALCIUM: CPT

## 2024-06-25 PROCEDURE — 82565 ASSAY OF CREATININE: CPT

## 2024-07-03 ENCOUNTER — TRANSCRIBE ORDERS (OUTPATIENT)
Dept: ADMINISTRATIVE | Facility: HOSPITAL | Age: 64
End: 2024-07-03
Payer: COMMERCIAL

## 2024-07-03 DIAGNOSIS — Z78.0 POST-MENOPAUSAL: Primary | ICD-10-CM

## 2024-07-03 DIAGNOSIS — M81.0 OSTEOPOROSIS, UNSPECIFIED OSTEOPOROSIS TYPE, UNSPECIFIED PATHOLOGICAL FRACTURE PRESENCE: ICD-10-CM

## 2024-07-03 DIAGNOSIS — Z79.899 ENCOUNTER FOR LONG-TERM (CURRENT) USE OF OTHER MEDICATIONS: ICD-10-CM

## 2024-11-18 ENCOUNTER — HOSPITAL ENCOUNTER (OUTPATIENT)
Dept: BONE DENSITY | Facility: HOSPITAL | Age: 64
Discharge: HOME | End: 2024-11-18
Admitting: INTERNAL MEDICINE
Payer: MEDICARE

## 2024-11-18 DIAGNOSIS — Z78.0 POST-MENOPAUSAL: ICD-10-CM

## 2024-11-18 DIAGNOSIS — M81.0 OSTEOPOROSIS, UNSPECIFIED OSTEOPOROSIS TYPE, UNSPECIFIED PATHOLOGICAL FRACTURE PRESENCE: ICD-10-CM

## 2024-11-18 DIAGNOSIS — Z79.899 ENCOUNTER FOR LONG-TERM (CURRENT) USE OF OTHER MEDICATIONS: ICD-10-CM

## 2024-11-18 PROCEDURE — 77080 DXA BONE DENSITY AXIAL: CPT

## 2024-12-18 ENCOUNTER — TRANSCRIBE ORDERS (OUTPATIENT)
Dept: ADMINISTRATIVE | Facility: HOSPITAL | Age: 64
End: 2024-12-18
Payer: MEDICARE

## 2024-12-18 DIAGNOSIS — M81.0 SENILE OSTEOPOROSIS: Primary | ICD-10-CM

## 2024-12-19 DIAGNOSIS — M81.0 OSTEOPOROSIS, POSTMENOPAUSAL: Primary | ICD-10-CM

## 2025-01-02 ENCOUNTER — HOSPITAL ENCOUNTER (OUTPATIENT)
Dept: INFUSION THERAPY | Facility: HOSPITAL | Age: 65
Discharge: HOME | End: 2025-01-02
Payer: MEDICARE

## 2025-01-02 VITALS
OXYGEN SATURATION: 98 % | TEMPERATURE: 98 F | DIASTOLIC BLOOD PRESSURE: 74 MMHG | RESPIRATION RATE: 20 BRPM | BODY MASS INDEX: 24.72 KG/M2 | HEART RATE: 98 BPM | WEIGHT: 152.34 LBS | SYSTOLIC BLOOD PRESSURE: 144 MMHG

## 2025-01-02 DIAGNOSIS — M81.0 OSTEOPOROSIS, POSTMENOPAUSAL: Primary | ICD-10-CM

## 2025-01-02 PROCEDURE — 25010000002 DENOSUMAB 60 MG/ML SOLUTION PREFILLED SYRINGE

## 2025-01-02 PROCEDURE — 96372 THER/PROPH/DIAG INJ SC/IM: CPT

## 2025-01-02 RX ADMIN — DENOSUMAB 60 MG: 60 INJECTION SUBCUTANEOUS at 15:18

## 2025-03-26 ENCOUNTER — TRANSCRIBE ORDERS (OUTPATIENT)
Dept: ADMINISTRATIVE | Facility: HOSPITAL | Age: 65
End: 2025-03-26
Payer: MEDICARE

## 2025-03-26 DIAGNOSIS — Z12.31 ENCOUNTER FOR SCREENING MAMMOGRAM FOR MALIGNANT NEOPLASM OF BREAST: Primary | ICD-10-CM

## 2025-03-28 ENCOUNTER — TRANSCRIBE ORDERS (OUTPATIENT)
Dept: ADMINISTRATIVE | Facility: HOSPITAL | Age: 65
End: 2025-03-28
Payer: MEDICARE

## 2025-03-28 DIAGNOSIS — M25.552 BILATERAL HIP PAIN: Primary | ICD-10-CM

## 2025-03-28 DIAGNOSIS — M43.9 COMPRESSION DEFORMITY OF VERTEBRA: ICD-10-CM

## 2025-03-28 DIAGNOSIS — M54.50 LUMBAR PAIN: ICD-10-CM

## 2025-03-28 DIAGNOSIS — M25.551 BILATERAL HIP PAIN: Primary | ICD-10-CM

## 2025-04-21 ENCOUNTER — HOSPITAL ENCOUNTER (OUTPATIENT)
Dept: CT IMAGING | Facility: HOSPITAL | Age: 65
Discharge: HOME | End: 2025-04-21
Payer: MEDICARE

## 2025-04-21 DIAGNOSIS — M43.9 COMPRESSION DEFORMITY OF VERTEBRA: ICD-10-CM

## 2025-04-21 DIAGNOSIS — M25.551 BILATERAL HIP PAIN: ICD-10-CM

## 2025-04-21 DIAGNOSIS — M25.552 BILATERAL HIP PAIN: ICD-10-CM

## 2025-04-21 DIAGNOSIS — M54.50 LUMBAR PAIN: ICD-10-CM

## 2025-04-21 PROCEDURE — 72192 CT PELVIS W/O DYE: CPT

## 2025-05-15 ENCOUNTER — HOSPITAL ENCOUNTER (OUTPATIENT)
Dept: MAMMOGRAPHY | Facility: HOSPITAL | Age: 65
Discharge: HOME | End: 2025-05-15
Admitting: NURSE PRACTITIONER
Payer: MEDICARE

## 2025-05-15 DIAGNOSIS — Z12.31 ENCOUNTER FOR SCREENING MAMMOGRAM FOR MALIGNANT NEOPLASM OF BREAST: ICD-10-CM

## 2025-05-15 PROCEDURE — 77063 BREAST TOMOSYNTHESIS BI: CPT

## 2025-05-15 PROCEDURE — 77067 SCR MAMMO BI INCL CAD: CPT

## 2025-07-23 ENCOUNTER — TRANSCRIBE ORDERS (OUTPATIENT)
Dept: ADMINISTRATIVE | Facility: HOSPITAL | Age: 65
End: 2025-07-23
Payer: MEDICARE

## 2025-07-23 DIAGNOSIS — Z12.31 ENCOUNTER FOR SCREENING MAMMOGRAM FOR MALIGNANT NEOPLASM OF BREAST: Primary | ICD-10-CM

## 2025-08-21 ENCOUNTER — TRANSCRIBE ORDERS (OUTPATIENT)
Dept: ADMINISTRATIVE | Facility: HOSPITAL | Age: 65
End: 2025-08-21
Payer: MEDICARE

## 2025-08-21 DIAGNOSIS — M47.816 LUMBAR SPONDYLOSIS: ICD-10-CM

## 2025-08-21 DIAGNOSIS — S32.040D CLOSED COMPRESSION FRACTURE OF L4 LUMBAR VERTEBRA WITH ROUTINE HEALING, SUBSEQUENT ENCOUNTER: Primary | ICD-10-CM

## 2025-08-21 DIAGNOSIS — M54.50 LUMBAR PAIN: ICD-10-CM

## 2025-08-22 ENCOUNTER — HOSPITAL ENCOUNTER (OUTPATIENT)
Dept: MRI IMAGING | Facility: HOSPITAL | Age: 65
Discharge: HOME | End: 2025-08-22
Payer: MEDICARE

## 2025-08-22 DIAGNOSIS — M54.50 LUMBAR PAIN: ICD-10-CM

## 2025-08-22 DIAGNOSIS — S32.040D CLOSED COMPRESSION FRACTURE OF L4 LUMBAR VERTEBRA WITH ROUTINE HEALING, SUBSEQUENT ENCOUNTER: ICD-10-CM

## 2025-08-22 DIAGNOSIS — M47.816 LUMBAR SPONDYLOSIS: ICD-10-CM

## 2025-08-22 PROCEDURE — 72148 MRI LUMBAR SPINE W/O DYE: CPT
